# Patient Record
Sex: FEMALE | Race: WHITE | NOT HISPANIC OR LATINO | Employment: PART TIME | URBAN - METROPOLITAN AREA
[De-identification: names, ages, dates, MRNs, and addresses within clinical notes are randomized per-mention and may not be internally consistent; named-entity substitution may affect disease eponyms.]

---

## 2023-10-27 PROBLEM — M05.79 SEROPOSITIVE RHEUMATOID ARTHRITIS OF MULTIPLE SITES (MULTI): Status: ACTIVE | Noted: 2023-10-27

## 2023-11-06 DIAGNOSIS — M05.79 SEROPOSITIVE RHEUMATOID ARTHRITIS OF MULTIPLE SITES (MULTI): ICD-10-CM

## 2023-11-06 PROBLEM — D84.1: Status: ACTIVE | Noted: 2023-11-06

## 2023-11-06 PROBLEM — G56.02 CARPAL TUNNEL SYNDROME OF LEFT WRIST: Status: ACTIVE | Noted: 2023-11-06

## 2023-11-06 PROBLEM — D75.89 MACROCYTOSIS WITHOUT ANEMIA: Status: ACTIVE | Noted: 2023-11-06

## 2023-11-06 PROBLEM — G93.32 CHRONIC FATIGUE SYNDROME: Status: ACTIVE | Noted: 2023-11-06

## 2023-11-06 PROBLEM — E78.5 HYPERLIPIDEMIA: Status: ACTIVE | Noted: 2023-11-06

## 2023-11-06 PROBLEM — D84.1 DEFECTS IN THE COMPLEMENT SYSTEM (MULTI): Status: ACTIVE | Noted: 2023-11-06

## 2023-11-06 PROBLEM — M79.7 FIBROMYALGIA: Status: ACTIVE | Noted: 2023-11-06

## 2023-11-06 PROBLEM — M65.30 TRIGGER FINGER: Status: ACTIVE | Noted: 2023-11-06

## 2023-11-06 RX ORDER — ESCITALOPRAM OXALATE 20 MG/1
20 TABLET ORAL DAILY
COMMUNITY
Start: 2023-05-23 | End: 2023-11-07

## 2023-11-06 RX ORDER — LEVONORGESTREL 52 MG/1
INTRAUTERINE DEVICE INTRAUTERINE
COMMUNITY
Start: 2021-09-17

## 2023-11-06 RX ORDER — DIPHENHYDRAMINE HYDROCHLORIDE 50 MG/ML
50 INJECTION INTRAMUSCULAR; INTRAVENOUS AS NEEDED
Status: CANCELLED | OUTPATIENT
Start: 2023-11-06

## 2023-11-06 RX ORDER — ALBUTEROL SULFATE 0.83 MG/ML
3 SOLUTION RESPIRATORY (INHALATION) AS NEEDED
Status: CANCELLED | OUTPATIENT
Start: 2023-11-06

## 2023-11-06 RX ORDER — FLUOCINONIDE 0.5 MG/G
CREAM TOPICAL
COMMUNITY
Start: 2023-10-16

## 2023-11-06 RX ORDER — TOCILIZUMAB 20 MG/ML
INJECTION, SOLUTION, CONCENTRATE INTRAVENOUS
COMMUNITY
Start: 2021-09-17

## 2023-11-06 RX ORDER — EPINEPHRINE 0.3 MG/.3ML
0.3 INJECTION SUBCUTANEOUS EVERY 5 MIN PRN
Status: CANCELLED | OUTPATIENT
Start: 2023-11-06

## 2023-11-06 RX ORDER — IBUPROFEN 200 MG
200 TABLET ORAL EVERY 8 HOURS PRN
COMMUNITY

## 2023-11-06 RX ORDER — CEPHALEXIN 500 MG/1
500 CAPSULE ORAL 2 TIMES DAILY
COMMUNITY
Start: 2021-08-28 | End: 2023-11-07 | Stop reason: ALTCHOICE

## 2023-11-06 RX ORDER — CITALOPRAM 40 MG/1
40 TABLET, FILM COATED ORAL DAILY
COMMUNITY
End: 2023-11-07

## 2023-11-06 RX ORDER — FAMOTIDINE 10 MG/ML
20 INJECTION INTRAVENOUS ONCE AS NEEDED
Status: CANCELLED | OUTPATIENT
Start: 2023-11-06

## 2023-11-06 RX ORDER — ACETAMINOPHEN 500 MG/1
500 CAPSULE, LIQUID FILLED ORAL EVERY 6 HOURS PRN
COMMUNITY

## 2023-11-07 ENCOUNTER — OFFICE VISIT (OUTPATIENT)
Dept: RHEUMATOLOGY | Facility: CLINIC | Age: 38
End: 2023-11-07
Payer: COMMERCIAL

## 2023-11-07 VITALS
DIASTOLIC BLOOD PRESSURE: 62 MMHG | OXYGEN SATURATION: 99 % | HEIGHT: 57 IN | WEIGHT: 115 LBS | BODY MASS INDEX: 24.81 KG/M2 | SYSTOLIC BLOOD PRESSURE: 100 MMHG | HEART RATE: 79 BPM

## 2023-11-07 DIAGNOSIS — M05.79 RHEUMATOID ARTHRITIS INVOLVING MULTIPLE SITES WITH POSITIVE RHEUMATOID FACTOR (MULTI): Primary | ICD-10-CM

## 2023-11-07 PROCEDURE — 1036F TOBACCO NON-USER: CPT | Performed by: INTERNAL MEDICINE

## 2023-11-07 PROCEDURE — 3008F BODY MASS INDEX DOCD: CPT | Performed by: INTERNAL MEDICINE

## 2023-11-07 PROCEDURE — 99213 OFFICE O/P EST LOW 20 MIN: CPT | Mod: 25 | Performed by: INTERNAL MEDICINE

## 2023-11-07 PROCEDURE — G0463 HOSPITAL OUTPT CLINIC VISIT: HCPCS | Mod: 25 | Performed by: INTERNAL MEDICINE

## 2023-11-07 PROCEDURE — 96413 CHEMO IV INFUSION 1 HR: CPT | Performed by: INTERNAL MEDICINE

## 2023-11-07 PROCEDURE — 99213 OFFICE O/P EST LOW 20 MIN: CPT | Performed by: INTERNAL MEDICINE

## 2023-11-07 ASSESSMENT — ROUTINE ASSESSMENT OF PATIENT INDEX DATA (RAPID3)
LIFT_CUP_TO_MOUTH: WITHOUT ANY DIFFICULTY
FEELINGS_ANXIETY_NERVOUS: WITH SOME DIFFICULTY
ON A SCALE OF ONE TO TEN, CONSIDERING ALL THE WAYS IN WHICH ILLNESS AND HEALTH CONDITIONS MAY AFFECT YOU AT THIS TIME, PLEASE INDICATE BELOW HOW YOU ARE DOING:: 0.5
ON A SCALE OF ONE TO TEN, HOW MUCH PAIN HAVE YOU HAD BECAUSE OF YOUR CONDITION OVER THE PAST WEEK?: 1
SUM OF QUESTIONS A TO J: 1
WASH_DRY_BODY: WITHOUT ANY DIFFICULTY
PARTIPATE_RECREATIONAL_ACTIVITIES: WITH SOME DIFFICULTY
ON A SCALE OF ONE TO TEN, CONSIDERING ALL THE WAYS IN WHICH ILLNESS AND HEALTH CONDITIONS MAY AFFECT YOU AT THIS TIME, PLEASE INDICATE BELOW HOW YOU ARE DOING:: 0.5
IN_OUT_TRANSPORT: WITHOUT ANY DIFFICULTY
SEVERITY_SCORE: 0
WALK_FLAT_GROUND: WITHOUT ANY DIFFICULTY
DRESS_YOURSELF: WITHOUT ANY DIFFICULTY
FN_SCORE: 0.3
GOOD_NIGHTS_SLEEP: WITHOUT ANY DIFFICULTY
WEIGHTED_TOTAL_SCORE: 0.6
PICK_CLOTHES_OFF_FLOOR: WITHOUT ANY DIFFICULTY
WALK_KILOMETERS: WITHOUT ANY DIFFICULTY
TOTAL RAPID3 SCORE: 1.8
FEELINGS_DEPRESSION: WITHOUT ANY DIFFICULTY
TURN_FAUCETS_OFF: WITHOUT ANY DIFFICULTY
ON A SCALE OF ONE TO TEN, HOW MUCH PAIN HAVE YOU HAD BECAUSE OF YOUR CONDITION OVER THE PAST WEEK?: 1
IN_OUT_BED: WITHOUT ANY DIFFICULTY

## 2023-11-07 ASSESSMENT — PAIN SCALES - GENERAL: PAINLEVEL: 0-NO PAIN

## 2023-11-07 ASSESSMENT — PATIENT HEALTH QUESTIONNAIRE - PHQ9
2. FEELING DOWN, DEPRESSED OR HOPELESS: NOT AT ALL
1. LITTLE INTEREST OR PLEASURE IN DOING THINGS: NOT AT ALL
SUM OF ALL RESPONSES TO PHQ9 QUESTIONS 1 AND 2: 0

## 2023-11-07 ASSESSMENT — JOINT PAIN
TOTAL NUMBER OF SWOLLEN JOINTS: 0
TOTAL NUMBER OF TENDER JOINTS: 0

## 2023-11-08 RX ORDER — EPINEPHRINE 0.3 MG/.3ML
0.3 INJECTION SUBCUTANEOUS EVERY 5 MIN PRN
Status: CANCELLED | OUTPATIENT
Start: 2023-12-06

## 2023-11-08 RX ORDER — FAMOTIDINE 10 MG/ML
20 INJECTION INTRAVENOUS ONCE AS NEEDED
Status: CANCELLED | OUTPATIENT
Start: 2023-12-06

## 2023-11-08 RX ORDER — ALBUTEROL SULFATE 0.83 MG/ML
3 SOLUTION RESPIRATORY (INHALATION) AS NEEDED
Status: CANCELLED | OUTPATIENT
Start: 2023-12-06

## 2023-11-08 RX ORDER — DIPHENHYDRAMINE HYDROCHLORIDE 50 MG/ML
50 INJECTION INTRAMUSCULAR; INTRAVENOUS AS NEEDED
Status: CANCELLED | OUTPATIENT
Start: 2023-12-06

## 2023-12-04 ENCOUNTER — LAB (OUTPATIENT)
Dept: LAB | Facility: LAB | Age: 38
End: 2023-12-04
Payer: COMMERCIAL

## 2023-12-04 DIAGNOSIS — M05.79 RHEUMATOID ARTHRITIS INVOLVING MULTIPLE SITES WITH POSITIVE RHEUMATOID FACTOR (MULTI): ICD-10-CM

## 2023-12-04 DIAGNOSIS — M05.79 SEROPOSITIVE RHEUMATOID ARTHRITIS OF MULTIPLE SITES (MULTI): ICD-10-CM

## 2023-12-04 LAB
ALBUMIN SERPL-MCNC: 4.9 G/DL (ref 3.5–5)
ALP BLD-CCNC: 35 U/L (ref 35–125)
ALT SERPL-CCNC: 19 U/L (ref 5–40)
ANION GAP SERPL CALC-SCNC: 13 MMOL/L
AST SERPL-CCNC: 22 U/L (ref 5–40)
BASOPHILS # BLD AUTO: 0.04 X10*3/UL (ref 0–0.1)
BASOPHILS # BLD AUTO: 0.04 X10*3/UL (ref 0–0.1)
BASOPHILS NFR BLD AUTO: 1 %
BASOPHILS NFR BLD AUTO: 1 %
BILIRUB SERPL-MCNC: 0.4 MG/DL (ref 0.1–1.2)
BUN SERPL-MCNC: 12 MG/DL (ref 8–25)
CALCIUM SERPL-MCNC: 9.4 MG/DL (ref 8.5–10.4)
CCP IGG SERPL-ACNC: 30 U/ML
CHLORIDE SERPL-SCNC: 103 MMOL/L (ref 97–107)
CHOLEST SERPL-MCNC: 215 MG/DL (ref 133–200)
CHOLEST/HDLC SERPL: 2.4 {RATIO}
CO2 SERPL-SCNC: 23 MMOL/L (ref 24–31)
CREAT SERPL-MCNC: 0.6 MG/DL (ref 0.4–1.6)
CRP SERPL-MCNC: <0.3 MG/DL (ref 0–2)
EOSINOPHIL # BLD AUTO: 0.07 X10*3/UL (ref 0–0.7)
EOSINOPHIL # BLD AUTO: 0.08 X10*3/UL (ref 0–0.7)
EOSINOPHIL NFR BLD AUTO: 1.8 %
EOSINOPHIL NFR BLD AUTO: 2 %
ERYTHROCYTE [DISTWIDTH] IN BLOOD BY AUTOMATED COUNT: 11.9 % (ref 11.5–14.5)
ERYTHROCYTE [SEDIMENTATION RATE] IN BLOOD BY WESTERGREN METHOD: 8 MM/H (ref 0–20)
GFR SERPL CREATININE-BSD FRML MDRD: >90 ML/MIN/1.73M*2
GLUCOSE SERPL-MCNC: 84 MG/DL (ref 65–99)
HAV IGM SER QL: NONREACTIVE
HBV CORE IGM SER QL: NONREACTIVE
HBV SURFACE AG SERPL QL IA: NONREACTIVE
HCT VFR BLD AUTO: 41.8 % (ref 36–46)
HCT VFR BLD AUTO: 42.1 % (ref 36–46)
HCT VFR BLD AUTO: 42.1 % (ref 36–46)
HCV AB SER QL: NONREACTIVE
HDLC SERPL-MCNC: 91 MG/DL
HGB BLD-MCNC: 13.8 G/DL (ref 12–16)
HGB BLD-MCNC: 14.1 G/DL (ref 12–16)
HGB BLD-MCNC: 14.1 G/DL (ref 12–16)
IMM GRANULOCYTES # BLD AUTO: 0.01 X10*3/UL (ref 0–0.7)
IMM GRANULOCYTES # BLD AUTO: 0.01 X10*3/UL (ref 0–0.7)
IMM GRANULOCYTES NFR BLD AUTO: 0.3 % (ref 0–0.9)
IMM GRANULOCYTES NFR BLD AUTO: 0.3 % (ref 0–0.9)
LDLC SERPL CALC-MCNC: 108 MG/DL (ref 65–130)
LYMPHOCYTES # BLD AUTO: 1.73 X10*3/UL (ref 1.2–4.8)
LYMPHOCYTES # BLD AUTO: 1.74 X10*3/UL (ref 1.2–4.8)
LYMPHOCYTES NFR BLD AUTO: 43.7 %
LYMPHOCYTES NFR BLD AUTO: 44.6 %
MCH RBC QN AUTO: 33.7 PG (ref 26–34)
MCH RBC QN AUTO: 34.1 PG (ref 26–34)
MCH RBC QN AUTO: 34.1 PG (ref 26–34)
MCHC RBC AUTO-ENTMCNC: 33 G/DL (ref 32–36)
MCHC RBC AUTO-ENTMCNC: 33.5 G/DL (ref 32–36)
MCHC RBC AUTO-ENTMCNC: 33.5 G/DL (ref 32–36)
MCV RBC AUTO: 102 FL (ref 80–100)
MONOCYTES # BLD AUTO: 0.39 X10*3/UL (ref 0.1–1)
MONOCYTES # BLD AUTO: 0.39 X10*3/UL (ref 0.1–1)
MONOCYTES NFR BLD AUTO: 10.1 %
MONOCYTES NFR BLD AUTO: 9.8 %
NEUTROPHILS # BLD AUTO: 1.64 X10*3/UL (ref 1.2–7.7)
NEUTROPHILS # BLD AUTO: 1.72 X10*3/UL (ref 1.2–7.7)
NEUTROPHILS NFR BLD AUTO: 42.2 %
NEUTROPHILS NFR BLD AUTO: 43.2 %
NRBC BLD-RTO: 0 /100 WBCS (ref 0–0)
PLATELET # BLD AUTO: 326 X10*3/UL (ref 150–450)
PLATELET # BLD AUTO: 326 X10*3/UL (ref 150–450)
PLATELET # BLD AUTO: 333 X10*3/UL (ref 150–450)
POTASSIUM SERPL-SCNC: 4.7 MMOL/L (ref 3.4–5.1)
PROT SERPL-MCNC: 7.2 G/DL (ref 5.9–7.9)
RBC # BLD AUTO: 4.09 X10*6/UL (ref 4–5.2)
RBC # BLD AUTO: 4.14 X10*6/UL (ref 4–5.2)
RBC # BLD AUTO: 4.14 X10*6/UL (ref 4–5.2)
RHEUMATOID FACT SER NEPH-ACNC: 71 IU/ML (ref 0–15)
SODIUM SERPL-SCNC: 139 MMOL/L (ref 133–145)
TRIGL SERPL-MCNC: 80 MG/DL (ref 40–150)
WBC # BLD AUTO: 3.9 X10*3/UL (ref 4.4–11.3)
WBC # BLD AUTO: 3.9 X10*3/UL (ref 4.4–11.3)
WBC # BLD AUTO: 4 X10*3/UL (ref 4.4–11.3)

## 2023-12-04 PROCEDURE — 80053 COMPREHEN METABOLIC PANEL: CPT

## 2023-12-04 PROCEDURE — 80061 LIPID PANEL: CPT

## 2023-12-04 PROCEDURE — 80074 ACUTE HEPATITIS PANEL: CPT

## 2023-12-04 PROCEDURE — 85025 COMPLETE CBC W/AUTO DIFF WBC: CPT

## 2023-12-04 PROCEDURE — 86481 TB AG RESPONSE T-CELL SUSP: CPT

## 2023-12-04 PROCEDURE — 86140 C-REACTIVE PROTEIN: CPT

## 2023-12-04 PROCEDURE — 85652 RBC SED RATE AUTOMATED: CPT

## 2023-12-04 PROCEDURE — 86431 RHEUMATOID FACTOR QUANT: CPT

## 2023-12-04 PROCEDURE — 86200 CCP ANTIBODY: CPT

## 2023-12-04 PROCEDURE — 36415 COLL VENOUS BLD VENIPUNCTURE: CPT

## 2023-12-06 LAB
NIL(NEG) CONTROL SPOT COUNT: NORMAL
PANEL A SPOT COUNT: 0
PANEL B SPOT COUNT: 1
POS CONTROL SPOT COUNT: NORMAL
T-SPOT. TB INTERPRETATION: NEGATIVE

## 2023-12-07 ENCOUNTER — APPOINTMENT (OUTPATIENT)
Dept: RHEUMATOLOGY | Facility: CLINIC | Age: 38
End: 2023-12-07
Payer: COMMERCIAL

## 2023-12-07 ENCOUNTER — OFFICE VISIT (OUTPATIENT)
Dept: RHEUMATOLOGY | Facility: CLINIC | Age: 38
End: 2023-12-07
Payer: COMMERCIAL

## 2023-12-07 VITALS
OXYGEN SATURATION: 100 % | HEART RATE: 79 BPM | WEIGHT: 112 LBS | DIASTOLIC BLOOD PRESSURE: 62 MMHG | BODY MASS INDEX: 24.16 KG/M2 | HEIGHT: 57 IN | SYSTOLIC BLOOD PRESSURE: 110 MMHG

## 2023-12-07 DIAGNOSIS — D75.89 MACROCYTOSIS WITHOUT ANEMIA: ICD-10-CM

## 2023-12-07 DIAGNOSIS — Z79.899 ON LONG TERM DRUG THERAPY: ICD-10-CM

## 2023-12-07 DIAGNOSIS — D84.1 DEFECTS IN THE COMPLEMENT SYSTEM (MULTI): ICD-10-CM

## 2023-12-07 DIAGNOSIS — M05.79 RHEUMATOID ARTHRITIS INVOLVING MULTIPLE SITES WITH POSITIVE RHEUMATOID FACTOR (MULTI): Primary | ICD-10-CM

## 2023-12-07 PROCEDURE — 1036F TOBACCO NON-USER: CPT | Performed by: INTERNAL MEDICINE

## 2023-12-07 PROCEDURE — 99214 OFFICE O/P EST MOD 30 MIN: CPT | Performed by: INTERNAL MEDICINE

## 2023-12-07 PROCEDURE — 3008F BODY MASS INDEX DOCD: CPT | Performed by: INTERNAL MEDICINE

## 2023-12-07 ASSESSMENT — ROUTINE ASSESSMENT OF PATIENT INDEX DATA (RAPID3)
FEELINGS_DEPRESSION: WITHOUT ANY DIFFICULTY
PICK_CLOTHES_OFF_FLOOR: WITHOUT ANY DIFFICULTY
SUM OF QUESTIONS A TO J: 1
WALK_KILOMETERS: WITHOUT ANY DIFFICULTY
ON A SCALE OF ONE TO TEN, HOW MUCH PAIN HAVE YOU HAD BECAUSE OF YOUR CONDITION OVER THE PAST WEEK?: 0.5
TURN_FAUCETS_OFF: WITHOUT ANY DIFFICULTY
DRESS_YOURSELF: WITHOUT ANY DIFFICULTY
TOTAL RAPID3 SCORE: 1.8
WEIGHTED_TOTAL_SCORE: 0.6
PARTIPATE_RECREATIONAL_ACTIVITIES: WITH SOME DIFFICULTY
IN_OUT_BED: WITHOUT ANY DIFFICULTY
ON A SCALE OF ONE TO TEN, CONSIDERING ALL THE WAYS IN WHICH ILLNESS AND HEALTH CONDITIONS MAY AFFECT YOU AT THIS TIME, PLEASE INDICATE BELOW HOW YOU ARE DOING:: 1
FN_SCORE: 0.3
LIFT_CUP_TO_MOUTH: WITHOUT ANY DIFFICULTY
FEELINGS_ANXIETY_NERVOUS: WITH SOME DIFFICULTY
SEVERITY_SCORE: 0
ON A SCALE OF ONE TO TEN, CONSIDERING ALL THE WAYS IN WHICH ILLNESS AND HEALTH CONDITIONS MAY AFFECT YOU AT THIS TIME, PLEASE INDICATE BELOW HOW YOU ARE DOING:: 1
WASH_DRY_BODY: WITHOUT ANY DIFFICULTY
IN_OUT_TRANSPORT: WITHOUT ANY DIFFICULTY
WALK_FLAT_GROUND: WITHOUT ANY DIFFICULTY
ON A SCALE OF ONE TO TEN, HOW MUCH PAIN HAVE YOU HAD BECAUSE OF YOUR CONDITION OVER THE PAST WEEK?: 0.5
GOOD_NIGHTS_SLEEP: WITHOUT ANY DIFFICULTY

## 2023-12-07 ASSESSMENT — PATIENT HEALTH QUESTIONNAIRE - PHQ9
2. FEELING DOWN, DEPRESSED OR HOPELESS: NOT AT ALL
SUM OF ALL RESPONSES TO PHQ9 QUESTIONS 1 AND 2: 0
1. LITTLE INTEREST OR PLEASURE IN DOING THINGS: NOT AT ALL

## 2023-12-07 ASSESSMENT — PAIN SCALES - GENERAL: PAINLEVEL: 0-NO PAIN

## 2023-12-07 ASSESSMENT — JOINT PAIN
TOTAL NUMBER OF TENDER JOINTS: 0
TOTAL NUMBER OF SWOLLEN JOINTS: 1

## 2023-12-07 NOTE — PROGRESS NOTES
"Bria DAVID Cristy     Chief Complaint:  This 38 y.o. year old female with seropositive rheumatoid arthritis (RA) presents for treatment with ACTEMRA.     HPI  Subjective:  Prob. #1: RA       The patient returns for her monthly treatment of RA with ACTEMRA.       The patient has been on treatment with this agent for several years, and has seen excellent control of RA disease activity. In fact, her disease activity is nil.        The patient denies any new joint swelling, prolonged AM stiffness, new joint deformities, new subcutaneous nodules, or cutaneous vasculitic ulcers of extremities.         In fact, the patient's joints are \"normal and work good\". She offers no complaints regarding the ACTEMRA infusions or new joint symptoms.         As pointed out previously, the patient is pleased with the outcome of her ACTEMRA treatment program. She has enjoyed being asymptomatic and having no adverse drug effects.     Review of Systems   All other systems reviewed and are negative.    Objective:  Vital signs:  Vitals:    12/07/23 1350   BP: 110/62   Pulse: 79   SpO2: 100%   Weight: 50.8 kg (112 lb)   Height: 1.448 m (4' 9.01\")   PainSc: 0-No pain     Physical Exam  Vitals and nursing note reviewed.   Constitutional:       General: She is not in acute distress.     Appearance: Normal appearance. She is normal weight. She is not ill-appearing.   HENT:      Head: Normocephalic.      Nose: Nose normal.      Mouth/Throat:      Mouth: Mucous membranes are moist.      Pharynx: Oropharynx is clear.   Eyes:      Extraocular Movements: Extraocular movements intact.      Conjunctiva/sclera: Conjunctivae normal.      Pupils: Pupils are equal, round, and reactive to light.   Neck:      Vascular: No carotid bruit.   Cardiovascular:      Rate and Rhythm: Normal rate and regular rhythm.      Pulses: Normal pulses.      Heart sounds: Normal heart sounds. No murmur heard.     No gallop.   Pulmonary:      Effort: Pulmonary effort is normal.    "   Breath sounds: No wheezing, rhonchi or rales.   Abdominal:      General: Abdomen is flat.      Palpations: Abdomen is soft.      Tenderness: There is no abdominal tenderness.   Musculoskeletal:         General: Swelling and deformity present. No tenderness or signs of injury.      Cervical back: Normal range of motion and neck supple. No rigidity or tenderness.   Lymphadenopathy:      Cervical: No cervical adenopathy.   Neurological:      Mental Status: She is alert.        Right knee: mild effusion/swelling w/o pain.     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  Long term drug therapy - Z79.899  Plan:  Patient can be treated today. Actemra 254 mg [5 mg/kg] IV over 1 hour.  The rationale, objectives, risk/reward and potential side effects have been explained to the patient and the patient understands these facts.  The patient further understands that periodic laboratory studies must be performed to monitor this drug and the patient will have these studies performed when the requisition is provided.  The patient is again educated to report to the office adverse effects including allergic reactions or infections.  The patient agreed to this plan.   2.   Return to office in 1 month for ACTEMRA infusion.  3.   Laboratory results reviewed with the patient and educated regarding treatment and potential side effects.  Diego Burgos MD

## 2024-01-09 ENCOUNTER — OFFICE VISIT (OUTPATIENT)
Dept: RHEUMATOLOGY | Facility: CLINIC | Age: 39
End: 2024-01-09
Payer: COMMERCIAL

## 2024-01-09 ENCOUNTER — APPOINTMENT (OUTPATIENT)
Dept: RHEUMATOLOGY | Facility: CLINIC | Age: 39
End: 2024-01-09
Payer: COMMERCIAL

## 2024-01-09 VITALS
SYSTOLIC BLOOD PRESSURE: 100 MMHG | DIASTOLIC BLOOD PRESSURE: 62 MMHG | HEART RATE: 77 BPM | OXYGEN SATURATION: 99 % | HEIGHT: 57 IN | BODY MASS INDEX: 24.81 KG/M2 | WEIGHT: 115 LBS

## 2024-01-09 DIAGNOSIS — M05.79 SEROPOSITIVE RHEUMATOID ARTHRITIS OF MULTIPLE SITES (MULTI): ICD-10-CM

## 2024-01-09 DIAGNOSIS — D84.1 DISORDER OF COMPLEMENT (MULTI): ICD-10-CM

## 2024-01-09 DIAGNOSIS — M05.79 RHEUMATOID ARTHRITIS INVOLVING MULTIPLE SITES WITH POSITIVE RHEUMATOID FACTOR (MULTI): Primary | ICD-10-CM

## 2024-01-09 DIAGNOSIS — E78.2 MIXED HYPERLIPIDEMIA: ICD-10-CM

## 2024-01-09 DIAGNOSIS — D75.89 MACROCYTOSIS WITHOUT ANEMIA: ICD-10-CM

## 2024-01-09 PROCEDURE — 2500000004 HC RX 250 GENERAL PHARMACY W/ HCPCS (ALT 636 FOR OP/ED): Mod: JZ

## 2024-01-09 PROCEDURE — 96413 CHEMO IV INFUSION 1 HR: CPT | Performed by: INTERNAL MEDICINE

## 2024-01-09 PROCEDURE — 1036F TOBACCO NON-USER: CPT | Performed by: INTERNAL MEDICINE

## 2024-01-09 PROCEDURE — 99213 OFFICE O/P EST LOW 20 MIN: CPT | Performed by: INTERNAL MEDICINE

## 2024-01-09 PROCEDURE — 3008F BODY MASS INDEX DOCD: CPT | Performed by: INTERNAL MEDICINE

## 2024-01-09 RX ORDER — ALBUTEROL SULFATE 0.83 MG/ML
3 SOLUTION RESPIRATORY (INHALATION) AS NEEDED
Status: CANCELLED | OUTPATIENT
Start: 2024-01-31

## 2024-01-09 RX ORDER — ATORVASTATIN CALCIUM 10 MG/1
10 TABLET, FILM COATED ORAL DAILY
Qty: 90 TABLET | Refills: 1 | Status: SHIPPED | OUTPATIENT
Start: 2024-01-09 | End: 2024-04-08

## 2024-01-09 RX ORDER — ATORVASTATIN CALCIUM 10 MG/1
10 TABLET, FILM COATED ORAL ONCE
Status: DISCONTINUED | OUTPATIENT
Start: 2024-01-09 | End: 2024-01-09

## 2024-01-09 RX ORDER — EPINEPHRINE 0.3 MG/.3ML
0.3 INJECTION SUBCUTANEOUS EVERY 5 MIN PRN
Status: CANCELLED | OUTPATIENT
Start: 2024-01-31

## 2024-01-09 RX ORDER — FAMOTIDINE 10 MG/ML
20 INJECTION INTRAVENOUS ONCE AS NEEDED
Status: CANCELLED | OUTPATIENT
Start: 2024-01-31

## 2024-01-09 RX ORDER — DIPHENHYDRAMINE HYDROCHLORIDE 50 MG/ML
50 INJECTION INTRAMUSCULAR; INTRAVENOUS AS NEEDED
Status: CANCELLED | OUTPATIENT
Start: 2024-01-31

## 2024-01-09 RX ADMIN — TOCILIZUMAB 300 MG: 20 INJECTION, SOLUTION, CONCENTRATE INTRAVENOUS at 10:22

## 2024-01-09 ASSESSMENT — ROUTINE ASSESSMENT OF PATIENT INDEX DATA (RAPID3)
WASH_DRY_BODY: WITHOUT ANY DIFFICULTY
ON A SCALE OF ONE TO TEN, CONSIDERING ALL THE WAYS IN WHICH ILLNESS AND HEALTH CONDITIONS MAY AFFECT YOU AT THIS TIME, PLEASE INDICATE BELOW HOW YOU ARE DOING:: 0.5
ON A SCALE OF ONE TO TEN, CONSIDERING ALL THE WAYS IN WHICH ILLNESS AND HEALTH CONDITIONS MAY AFFECT YOU AT THIS TIME, PLEASE INDICATE BELOW HOW YOU ARE DOING:: 0.5
TURN_FAUCETS_OFF: WITHOUT ANY DIFFICULTY
PARTIPATE_RECREATIONAL_ACTIVITIES: WITH SOME DIFFICULTY
SUM OF QUESTIONS A TO J: 1
WALK_KILOMETERS: WITHOUT ANY DIFFICULTY
ON A SCALE OF ONE TO TEN, HOW MUCH PAIN HAVE YOU HAD BECAUSE OF YOUR CONDITION OVER THE PAST WEEK?: 0.5
IN_OUT_TRANSPORT: WITHOUT ANY DIFFICULTY
WALK_FLAT_GROUND: WITHOUT ANY DIFFICULTY
TOTAL RAPID3 SCORE: 1.3
FEELINGS_DEPRESSION: WITHOUT ANY DIFFICULTY
FN_SCORE: 0.3
FEELINGS_ANXIETY_NERVOUS: WITH SOME DIFFICULTY
ON A SCALE OF ONE TO TEN, HOW MUCH PAIN HAVE YOU HAD BECAUSE OF YOUR CONDITION OVER THE PAST WEEK?: 0.5
WEIGHTED_TOTAL_SCORE: 0.43
LIFT_CUP_TO_MOUTH: WITHOUT ANY DIFFICULTY
GOOD_NIGHTS_SLEEP: WITHOUT ANY DIFFICULTY
PICK_CLOTHES_OFF_FLOOR: WITHOUT ANY DIFFICULTY
SEVERITY_SCORE: 0
IN_OUT_BED: WITHOUT ANY DIFFICULTY
DRESS_YOURSELF: WITHOUT ANY DIFFICULTY

## 2024-01-09 ASSESSMENT — PATIENT HEALTH QUESTIONNAIRE - PHQ9: 2. FEELING DOWN, DEPRESSED OR HOPELESS: NOT AT ALL

## 2024-01-09 ASSESSMENT — PAIN SCALES - GENERAL: PAINLEVEL: 0-NO PAIN

## 2024-01-09 NOTE — PROGRESS NOTES
"Chief Complaint:  This patient is a 38 y.o. female with seropositive rheumatoid arthritis (RA) who presents for monthly treatment with ACTEMRA.    Subjective:   HPI   Problem:  1: RA       The patient presents for treatment of RA with IV ACTEMRA (TOCILIZUMAB).        The patient denies any adverse drug reactions of any type, and states she has had a very good response to treatment with remission of RA.       The patient reports no interval infections or illnesses requiring treatment despite the occurrence of strep throats in both children and her  had a cough.        Today, the patient feels well and states she can be treated.    Review of Systems   All other systems reviewed and are negative.      Objective:   /62 (BP Location: Left arm, Patient Position: Sitting)   Pulse 77   Ht 1.448 m (4' 9.01\")   Wt 52.2 kg (115 lb)   SpO2 99%   BMI 24.88 kg/m²      Physical Exam  Exam not performed.  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 Actemra 363 mg [5 mg/kg] IV over 1 hour.  The rationale, objectives, risk/reward and potential side effects have been explained to the patient and the patient understands these facts.  The patient further understands that periodic laboratory studies must be performed to monitor this drug and the patient will have these studies performed when the requisition is provided.  The patient is again educated to report to the office adverse effects including allergic reactions or infections.  The patient agreed to this plan.   2.   Mixed hyperlipidemia: I reviewed the results of the patient's last three years of lipid studies. There is definite hyperlipidemia, and, in particular, in the presence of RA should be treated. I recommend treatment with ATORVASTATIN 10 mg/day. I " have discussed with the patient the indications, rationale, risk/benefits, potential adverse effects and probable duration of therapy.  I have answered all questions. Patient states agreement with this recommendation, and will report any side effects to the office should any occur.  3.      Return to office for ACTERMA treatment in one month.       Diego Burgos MD

## 2024-01-09 NOTE — PROGRESS NOTES
Patient tolerated infusion without reaction, vitals stable. Next appointment made and provided to patient.

## 2024-02-06 ENCOUNTER — CLINICAL SUPPORT (OUTPATIENT)
Dept: RHEUMATOLOGY | Facility: CLINIC | Age: 39
End: 2024-02-06
Payer: COMMERCIAL

## 2024-02-06 VITALS
SYSTOLIC BLOOD PRESSURE: 115 MMHG | TEMPERATURE: 98.2 F | HEART RATE: 78 BPM | DIASTOLIC BLOOD PRESSURE: 73 MMHG | OXYGEN SATURATION: 100 %

## 2024-02-06 DIAGNOSIS — M05.79 SEROPOSITIVE RHEUMATOID ARTHRITIS OF MULTIPLE SITES (MULTI): ICD-10-CM

## 2024-02-06 PROCEDURE — 2500000004 HC RX 250 GENERAL PHARMACY W/ HCPCS (ALT 636 FOR OP/ED)

## 2024-02-06 RX ORDER — FAMOTIDINE 10 MG/ML
20 INJECTION INTRAVENOUS ONCE AS NEEDED
Status: CANCELLED | OUTPATIENT
Start: 2024-02-08

## 2024-02-06 RX ORDER — DIPHENHYDRAMINE HYDROCHLORIDE 50 MG/ML
50 INJECTION INTRAMUSCULAR; INTRAVENOUS AS NEEDED
Status: CANCELLED | OUTPATIENT
Start: 2024-02-08

## 2024-02-06 RX ORDER — ALBUTEROL SULFATE 0.83 MG/ML
3 SOLUTION RESPIRATORY (INHALATION) AS NEEDED
Status: CANCELLED | OUTPATIENT
Start: 2024-02-08

## 2024-02-06 RX ORDER — EPINEPHRINE 0.3 MG/.3ML
0.3 INJECTION SUBCUTANEOUS EVERY 5 MIN PRN
Status: CANCELLED | OUTPATIENT
Start: 2024-02-08

## 2024-02-06 RX ADMIN — TOCILIZUMAB 300 MG: 20 INJECTION, SOLUTION, CONCENTRATE INTRAVENOUS at 11:21

## 2024-02-06 NOTE — PROGRESS NOTES
The patient completed her infusion without any signs/symptoms of adverse reaction.  IV flushed with 10ml NS, IV cath removed and site dressed with 2x2 gauze and bandaid.

## 2024-03-10 ENCOUNTER — DOCUMENTATION (OUTPATIENT)
Dept: INFUSION THERAPY | Facility: CLINIC | Age: 39
End: 2024-03-10
Payer: COMMERCIAL

## 2024-03-10 DIAGNOSIS — M06.9 RHEUMATOID ARTHRITIS, INVOLVING UNSPECIFIED SITE, UNSPECIFIED WHETHER RHEUMATOID FACTOR PRESENT (MULTI): Primary | ICD-10-CM

## 2024-03-10 NOTE — PROGRESS NOTES
Patient to be scheduled for Continuation of Actemra infusions.     For Diagnosis: Rheumatoid Arthritis     Dosing is weight based at: 6 mg / kg     Using Dosing weight of: 50 kg    For a Total dose of: 300 mg every 4 weeks.    Labs..  T Spot Drawn/Results:   Lab Results   Component Value Date    TBSIN Negative 12/04/2023    TBGRES Negative  Reference range: NEGATIVE   02/07/2022    TSPOTR  05/17/2023     Negative  Reference range: Normal Value: Negative    A negative test result does not exclude the possibility of exposure  to   or infection with Mycobacterium tuberculosis (M. tuberculosis).    Patients with recent exposure to TB infected individuals exhibiting a   negative T-SPOT.TB result should be considered for retesting within 6   weeks or if other relevant clinical symptoms indicate.  Results from   T-SPOT.TB testing must be used in conjunction with each individual's   epidemiological history, current medical status, and results of other   diagnostic evaluations.  The T-SPOT.TB test is qualitative and  results   are reported as positive, borderline or negative, given that the test   controls perform as expected. In line with the Centers for Disease   Control and Prevention's 2010 recommendation to report quantitative   measurements alongside the qualitative result, the laboratory  provides   spot counts for informational purposes only.  The T-SPOT.TB test  should   not be interpreted as a quantitative test.  Test performed at:  [address] [address]        Hep B Drawn/Results:  Lab Results   Component Value Date    HEPBSAG Nonreactive 12/04/2023        Lipids drawn:   Lab Results   Component Value Date    CHOL 215 (H) 12/04/2023    CHOL 220 (H) 05/17/2023    CHOL 188 01/07/2021     Lab Results   Component Value Date    HDL 91.0 12/04/2023    HDL 98 05/17/2023    HDL 89 01/07/2021     Lab Results   Component Value Date    LDLCALC 108 12/04/2023    LDLCALC 109 05/17/2023    LDLCALC 85  "01/07/2021     Lab Results   Component Value Date    TRIG 80 12/04/2023    TRIG 65 05/17/2023    TRIG 71 01/07/2021     No components found for: \"CHOLHDL\"     CBC w/ diff drawn:   Lab Results   Component Value Date    WBC 3.9 (L) 12/04/2023    WBC 4.0 (L) 12/04/2023    WBC 3.9 (L) 12/04/2023    HGB 14.1 12/04/2023    HGB 13.8 12/04/2023    HGB 14.1 12/04/2023    HCT 42.1 12/04/2023    HCT 41.8 12/04/2023    HCT 42.1 12/04/2023     (H) 12/04/2023     (H) 12/04/2023     (H) 12/04/2023     12/04/2023     12/04/2023     12/04/2023        WBC:   Lab Results   Component Value Date    WBC 3.9 (L) 12/04/2023    WBC 4.0 (L) 12/04/2023    WBC 3.9 (L) 12/04/2023        Plt:   Lab Results   Component Value Date     12/04/2023     12/04/2023     12/04/2023      (Initial: >=100,000. Hold/contact prescribing provider if: <=100,000)    ANC:   Lab Results   Component Value Date    NEUTROABS 1.64 12/04/2023    NEUTROABS 1.72 12/04/2023      (Initial: >2000   Hold/contact prescribing provider if:  <1000)    ALT:   Lab Results   Component Value Date    ALT 19 12/04/2023      (Initial: <1.5X ULN (and/or)  Hold/contact prescribing provider if: >3X ULN)    AST:   Lab Results   Component Value Date    ALT 19 12/04/2023    AST 22 12/04/2023    ALKPHOS 35 12/04/2023    BILITOT 0.4 12/04/2023        Chemistry    Lab Results   Component Value Date/Time     12/04/2023 1117    K 4.7 12/04/2023 1117     12/04/2023 1117    CO2 23 (L) 12/04/2023 1117    BUN 12 12/04/2023 1117    CREATININE 0.60 12/04/2023 1117    Lab Results   Component Value Date/Time    CALCIUM 9.4 12/04/2023 1117    ALKPHOS 35 12/04/2023 1117    AST 22 12/04/2023 1117    ALT 19 12/04/2023 1117    BILITOT 0.4 12/04/2023 1117           (Initial: <1.5X ULN (and/or) Hold/contact prescribing provider if : >3X ULN)    Urine Hcg test ordered piror to first infusion? Yes (If female pt <60 years of age and with " reproductive capability)   (If urine Hcg test ordered please instruct pt upon scheduling to drink 32 ounces of water 1 hour before arrival so bladder is full for needed urine sample)    History of malignancy or GI perforation? No  Patient Active Problem List   Diagnosis    Seropositive rheumatoid arthritis of multiple sites (CMS/HCC)    Carpal tunnel syndrome of left wrist    Chronic fatigue syndrome    Complement 2 deficiency (CMS/HCC)    Defects in the complement system (CMS/HCC)    Fibromyalgia    Hyperlipidemia    Macrocytosis without anemia    Trigger finger      Past Medical History:   Diagnosis Date    Seropositive rheumatoid arthritis of multiple sites (CMS/HCC) 10/27/2023        Last infusion received: 2/6/24 (if continuation)   Due: Scheduled 3/12/24    These results meet criteria to treat.

## 2024-03-12 ENCOUNTER — INFUSION (OUTPATIENT)
Dept: INFUSION THERAPY | Facility: CLINIC | Age: 39
End: 2024-03-12
Payer: COMMERCIAL

## 2024-03-12 ENCOUNTER — APPOINTMENT (OUTPATIENT)
Dept: RHEUMATOLOGY | Facility: CLINIC | Age: 39
End: 2024-03-12
Payer: COMMERCIAL

## 2024-03-12 VITALS
DIASTOLIC BLOOD PRESSURE: 75 MMHG | OXYGEN SATURATION: 100 % | RESPIRATION RATE: 18 BRPM | WEIGHT: 108.9 LBS | TEMPERATURE: 98.2 F | SYSTOLIC BLOOD PRESSURE: 106 MMHG | BODY MASS INDEX: 23.56 KG/M2 | HEART RATE: 97 BPM

## 2024-03-12 DIAGNOSIS — M05.79 SEROPOSITIVE RHEUMATOID ARTHRITIS OF MULTIPLE SITES (MULTI): ICD-10-CM

## 2024-03-12 PROCEDURE — 96365 THER/PROPH/DIAG IV INF INIT: CPT | Performed by: NURSE PRACTITIONER

## 2024-03-12 RX ORDER — EPINEPHRINE 0.3 MG/.3ML
0.3 INJECTION SUBCUTANEOUS EVERY 5 MIN PRN
Status: CANCELLED | OUTPATIENT
Start: 2024-03-27

## 2024-03-12 RX ORDER — FAMOTIDINE 10 MG/ML
20 INJECTION INTRAVENOUS ONCE AS NEEDED
Status: CANCELLED | OUTPATIENT
Start: 2024-03-27

## 2024-03-12 RX ORDER — DIPHENHYDRAMINE HYDROCHLORIDE 50 MG/ML
50 INJECTION INTRAMUSCULAR; INTRAVENOUS AS NEEDED
Status: CANCELLED | OUTPATIENT
Start: 2024-03-27

## 2024-03-12 RX ORDER — ALBUTEROL SULFATE 0.83 MG/ML
3 SOLUTION RESPIRATORY (INHALATION) AS NEEDED
Status: CANCELLED | OUTPATIENT
Start: 2024-03-27

## 2024-03-12 ASSESSMENT — PAIN SCALES - GENERAL: PAINLEVEL: 0-NO PAIN

## 2024-03-12 NOTE — PATIENT INSTRUCTIONS
Today :Bria Muniz had no medications administered during this visit.     For:   1. Seropositive rheumatoid arthritis of multiple sites (CMS/[facility])         Your next appointment is due in:  28 Days        Please read the  Medication Guide that was given to you and reviewed during todays visit.     (Tell all doctors including dentists that you are taking this medication)     Go to the emergency room or call 911 if:  -You have signs of allergic reaction:   -Rash, hives, itching.   -Swollen, blistered, peeling skin.   -Swelling of face, lips, mouth, tongue or throat.   -Tightness of chest, trouble breathing, swallowing or talking     Call your doctor:  - If IV / injection site gets red, warm, swollen, itchy or leaks fluid or pus.     (Leave dressing on your IV site for at least 2 hours and keep area clean and dry  - If you get sick or have symptoms of infection or are not feeling well for any reason.    (Wash your hands often, stay away from people who are sick)  - If you have side effects from your medication that do not go away or are bothersome.     (Refer to the teaching your nurse gave you for side effects to call your doctor about)    - Common side effects may include:  stuffy nose, headache, feeling tired, muscle aches, upset stomach  - Before receiving any vaccines     - Call the [facility] at   If:  - You get sick, are on antibiotics, have had a recent vaccine, have surgery or dental work and your doctor wants your visit rescheduled.  - You need to cancel and reschedule your visit for any reason. Call at least 2 days before your visit if you need to cancel.   - Your insurance changes before your next visit.    (We will need to get approval from your new insurance. This can take up to two weeks.)     The [facility] is opened Monday thru Friday. We are closed on weekends and holidays.   Voice mail will take your call if the center is closed. If you leave a message please  allow 24 hours for a call back during weekdays. If you leave a message on a weekend/holiday, we will call you back the next business day.

## 2024-03-12 NOTE — PROGRESS NOTES
[facility]   Infusion Clinic Note   Date: 2024   Name: Bria Muniz  : 1985   MRN: [id number]         Reason for Visit: OP Infusion (Actemra)         Visit Type: INFUSION       Ordered By: Dr Burgos      Accompanied by:Self      Diagnosis: Seropositive rheumatoid arthritis of multiple sites (CMS/HCC)       Allergies:   Allergies as of 2024 - Reviewed 2024   Allergen Reaction Noted    Penicillins Hives 2023    Tramadol Dizziness 2023         Current Medications has a current medication list which includes the following prescription(s): acetaminophen, atorvastatin, fluocinonide, ibuprofen, mirena, and actemra, and the following Facility-Administered Medications: tocilizumab (Actemra) 300 mg in sodium chloride 0.9% 100 mL IV.       Vitals:   Vitals:    24 1012   BP: 120/71   Pulse: 99   Resp: 18   Temp: 36.9 °C (98.4 °F)   SpO2: 100%   Weight: 49.4 kg (108 lb 14.4 oz)   PainSc: 0-No pain             Infusion Pre-procedure Checklist:   - Allergies reviewed: yes   - Medications reviewed: yes       - Previous reaction to current treatment: no      Assess patient for the concerns below. Document provider notification as appropriate.  - Active or recent infection with/without current antibiotic use: no  - Recent or planned invasive dental work: no  - Recent or planned surgeries: no  - Recently received or plans to receive vaccinations: no  - Has treatment related toxicities: no  - Is pregnant:  no      Pain: 0   - Is the pain different from normal: no   - Is the pain tolerable: n/a   - Is your Doctor aware:  n/a      Labs: N/A         Fall Risk Screening: Sakshi Fall Risk  History of Falling, Immediate or Within 3 Months: No  Secondary Diagnosis: No  Ambulatory Aid: Walks without aid/bedrest/nurse assist  Intravenous Therapy/Heparin Lock: Yes  Gait/Transferring: Normal/bedrest/immobile  Mental Status: Oriented to own ability  Cantor Fall Risk Score: 20        Review Of Systems:  Review of Systems - Oncology      Infusion Readiness:   - Assessment Concerns Related to Infusion: No  - Provider notified: n/a      Document Below Only If Indicated:   New Patient Education:    N/A (returning patient for continuation of therapy. Ongoing education provided as needed.)        Treatment Conditions & Drug Specific Questions:    Tocilizumab  (ACTEMRA)   (Unless otherwise specified on patient specific therapy plan):     TREATMENT CONDITIONS:  Unless otherwise specified on patient specific therapy plan HOLD and notify provider prior to proceeding with treatment if:   o Platelets LESS than 100 x 10E9L  o ANC LESS than 2 x 10E9/L  o ALT GREATER than 1.5x ULN and/or AST GREATER than 1.5x ULN  o Positive T-spot  o Reactive Hepatitis B Surface Antigen    Lab Results   Component Value Date     12/04/2023     12/04/2023     12/04/2023      Lab Results   Component Value Date    NEUTROABS 1.64 12/04/2023    NEUTROABS 1.72 12/04/2023        Chemistry    Lab Results   Component Value Date/Time     12/04/2023 1117    K 4.7 12/04/2023 1117     12/04/2023 1117    CO2 23 (L) 12/04/2023 1117    BUN 12 12/04/2023 1117    CREATININE 0.60 12/04/2023 1117    Lab Results   Component Value Date/Time    CALCIUM 9.4 12/04/2023 1117    ALKPHOS 35 12/04/2023 1117    AST 22 12/04/2023 1117    ALT 19 12/04/2023 1117    BILITOT 0.4 12/04/2023 1117        ,  Lab Results   Component Value Date    ALT 19 12/04/2023    AST 22 12/04/2023    ALKPHOS 35 12/04/2023    BILITOT 0.4 12/04/2023      Lab Results   Component Value Date    TBSIN Negative 12/04/2023    TBGRES Negative  Reference range: NEGATIVE   02/07/2022    TSPOTR  05/17/2023     Negative  Reference range: Normal Value: Negative    A negative test result does not exclude the possibility of exposure  to   or infection with Mycobacterium tuberculosis (M. tuberculosis).    Patients with recent exposure to TB infected  "individuals exhibiting a   negative T-SPOT.TB result should be considered for retesting within 6   weeks or if other relevant clinical symptoms indicate.  Results from   T-SPOT.TB testing must be used in conjunction with each individual's   epidemiological history, current medical status, and results of other   diagnostic evaluations.  The T-SPOT.TB test is qualitative and  results   are reported as positive, borderline or negative, given that the test   controls perform as expected. In line with the Centers for Disease   Control and Prevention's 2010 recommendation to report quantitative   measurements alongside the qualitative result, the laboratory  provides   spot counts for informational purposes only.  The T-SPOT.TB test  should   not be interpreted as a quantitative test.  Test performed at:  [facility] [address] [place] [address]      No results found for: \"NONUHFIRE\", \"NONUHSWGH\", \"NONUHFISH\", \"EXTHEPBSAG\"  Lab Results   Component Value Date    HEPBSAG Nonreactive 12/04/2023      Lab Results   Component Value Date    HEPAIGM Nonreactive 12/04/2023    HEPBCIGM Nonreactive 12/04/2023    HEPCAB Nonreactive 12/04/2023         Labs reviewed and patient meets treatment conditions? Yes    DRUG SPECIFIC QUESTIONS:  Any history of or new diagnosis of GI perforation? No  (Actemra may increase risk of bowel perforation)      REMINDERS:  PREGNANCY CATEGORY X DRUG. OBTAIN NEGTATIVE PREGNANCY TEST PRIOR TO FIRST INFUSION FOR WOMEN OF CHILDBEARING ABILITY   WEIGHT BASED DRUG     Recommended Vitals/Observation:  Vitals: Monitor patient's vital signs prior to infusion start, every 30 minutes during infusion and 30 minutes after infusion.   Observation: Patient may leave 30 minutes post infusion.        Weight Based Drug Calculations:    WEIGHT BASED DRUGS: Tocilizumab (ACTEMRA)   Patient's dosing weight (kg): 50kg     10% weight variance for prescribed treatment: 45 kg to 55 kg     Patient's weight today:   Vitals: "    03/12/24 1012   Weight: 49.4 kg (108 lb 14.4 oz)         weight range for prescribed dose: 6mg/kg    Patient weight today falls outside of 10% variance or  weight range: No     Home Care pharmacist informed of weight variance: NA    Doses that are weight based have an acceptable variance rule within 10% of the prescribed   order and/or within  weight range. If patient weight on day of infusion falls   outside of the 10% variance, or weight range, infusion is administered and   pharmacy contacted regarding future dosing adjustments, per policy.         Initiated By: Tiffany Jarvis RN   Time: 10:45 AM     Bria had 300mg Actemra in 100ml NS administered on 03/12/24.   1200 Pt discharged after 30 minute observation and is free of signs or symptoms of adverse reaction.

## 2024-04-09 DIAGNOSIS — M05.79 SEROPOSITIVE RHEUMATOID ARTHRITIS OF MULTIPLE SITES (MULTI): Primary | ICD-10-CM

## 2024-04-11 ENCOUNTER — APPOINTMENT (OUTPATIENT)
Dept: RHEUMATOLOGY | Facility: CLINIC | Age: 39
End: 2024-04-11
Payer: COMMERCIAL

## 2024-04-15 ENCOUNTER — APPOINTMENT (OUTPATIENT)
Dept: INFUSION THERAPY | Facility: CLINIC | Age: 39
End: 2024-04-15
Payer: COMMERCIAL

## 2024-04-16 ENCOUNTER — OFFICE VISIT (OUTPATIENT)
Dept: RHEUMATOLOGY | Facility: CLINIC | Age: 39
End: 2024-04-16
Payer: COMMERCIAL

## 2024-04-16 VITALS — OXYGEN SATURATION: 98 % | DIASTOLIC BLOOD PRESSURE: 62 MMHG | SYSTOLIC BLOOD PRESSURE: 108 MMHG | HEART RATE: 68 BPM

## 2024-04-16 DIAGNOSIS — M05.79 SEROPOSITIVE RHEUMATOID ARTHRITIS OF MULTIPLE SITES (MULTI): Primary | ICD-10-CM

## 2024-04-16 PROCEDURE — 1036F TOBACCO NON-USER: CPT | Performed by: INTERNAL MEDICINE

## 2024-04-16 PROCEDURE — 3008F BODY MASS INDEX DOCD: CPT | Performed by: INTERNAL MEDICINE

## 2024-04-16 PROCEDURE — 99213 OFFICE O/P EST LOW 20 MIN: CPT | Performed by: INTERNAL MEDICINE

## 2024-04-16 ASSESSMENT — PATIENT HEALTH QUESTIONNAIRE - PHQ9
SUM OF ALL RESPONSES TO PHQ9 QUESTIONS 1 AND 2: 0
1. LITTLE INTEREST OR PLEASURE IN DOING THINGS: NOT AT ALL
2. FEELING DOWN, DEPRESSED OR HOPELESS: NOT AT ALL

## 2024-04-16 ASSESSMENT — ROUTINE ASSESSMENT OF PATIENT INDEX DATA (RAPID3)
FN_SCORE: 0.7
WALK_FLAT_GROUND: WITHOUT ANY DIFFICULTY
FEELINGS_ANXIETY_NERVOUS: WITH SOME DIFFICULTY
PICK_CLOTHES_OFF_FLOOR: WITHOUT ANY DIFFICULTY
IN_OUT_BED: WITHOUT ANY DIFFICULTY
FEELINGS_DEPRESSION: WITHOUT ANY DIFFICULTY
DRESS_YOURSELF: WITHOUT ANY DIFFICULTY
WASH_DRY_BODY: WITHOUT ANY DIFFICULTY
TURN_FAUCETS_OFF: WITHOUT ANY DIFFICULTY
ON A SCALE OF ONE TO TEN, HOW MUCH PAIN HAVE YOU HAD BECAUSE OF YOUR CONDITION OVER THE PAST WEEK?: 0.5
WALK_KILOMETERS: WITH SOME DIFFICULTY
PARTIPATE_RECREATIONAL_ACTIVITIES: WITH SOME DIFFICULTY
TOTAL RAPID3 SCORE: 1.7
ON A SCALE OF ONE TO TEN, CONSIDERING ALL THE WAYS IN WHICH ILLNESS AND HEALTH CONDITIONS MAY AFFECT YOU AT THIS TIME, PLEASE INDICATE BELOW HOW YOU ARE DOING:: 0.5
SUM OF QUESTIONS A TO J: 2
SEVERITY_SCORE: 0
IN_OUT_TRANSPORT: WITHOUT ANY DIFFICULTY
ON A SCALE OF ONE TO TEN, HOW MUCH PAIN HAVE YOU HAD BECAUSE OF YOUR CONDITION OVER THE PAST WEEK?: 0.5
WEIGHTED_TOTAL_SCORE: 0.57
ON A SCALE OF ONE TO TEN, CONSIDERING ALL THE WAYS IN WHICH ILLNESS AND HEALTH CONDITIONS MAY AFFECT YOU AT THIS TIME, PLEASE INDICATE BELOW HOW YOU ARE DOING:: 0.5
LIFT_CUP_TO_MOUTH: WITHOUT ANY DIFFICULTY
GOOD_NIGHTS_SLEEP: WITHOUT ANY DIFFICULTY

## 2024-04-16 ASSESSMENT — JOINT PAIN
TOTAL NUMBER OF TENDER JOINTS: 0
TOTAL NUMBER OF SWOLLEN JOINTS: 0

## 2024-04-16 ASSESSMENT — ENCOUNTER SYMPTOMS
LOSS OF SENSATION IN FEET: 0
OCCASIONAL FEELINGS OF UNSTEADINESS: 0
DEPRESSION: 0

## 2024-04-16 ASSESSMENT — PAIN SCALES - GENERAL: PAINLEVEL_OUTOF10: 0 - NO PAIN

## 2024-04-16 ASSESSMENT — PAIN - FUNCTIONAL ASSESSMENT: PAIN_FUNCTIONAL_ASSESSMENT: 0-10

## 2024-04-16 NOTE — PROGRESS NOTES
Chief Complaint:  This 38 y.o. female presents with the chief complaint of seropositive rheumatoid arthritis (RA). Follow-up     Subjective:   HPI   Problem:  1: RA       The patient returns for ongoing evaluation and management of RA.       The patient has had her infusion with ACTEMRA, and reports that the RA is well controlled. She further reports no adverse drug effects.        The patient is prepared for her infusion tomorrow at [facility].    Review of Systems   All other systems reviewed and are negative.    Objective:   /62   Pulse 68   SpO2 98%      Physical Exam  Vitals and nursing note reviewed.   Constitutional:       Appearance: She is normal weight.   HENT:      Head: Normocephalic.   Eyes:      Extraocular Movements: Extraocular movements intact.      Conjunctiva/sclera: Conjunctivae normal.      Pupils: Pupils are equal, round, and reactive to light.   Neck:      Vascular: No carotid bruit.   Cardiovascular:      Rate and Rhythm: Normal rate and regular rhythm.      Pulses: Normal pulses.      Heart sounds: Normal heart sounds. No murmur heard.  Pulmonary:      Effort: Pulmonary effort is normal.      Breath sounds: Normal breath sounds.   Musculoskeletal:         General: No swelling, tenderness or deformity. Normal range of motion.      Cervical back: Normal range of motion and neck supple. No rigidity or tenderness.   Lymphadenopathy:      Cervical: No cervical adenopathy.   Neurological:      Mental Status: She is alert.        No active rheumatoid synovitis; slight left hand ulnar deviation     Assessment:   Rheumatoid arthritis - M05.79  2.   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   Macrocytosis w/o anemia, secondary to ACTEMRA - D75.89  4.   MIXED HYPERLIPIDEMIA - E78.2  5.   Long-term current drug therapyp - Z79.899    Plan:    Procedures : Actemra 256 mg [5 mg/kg] IV over 1 hour.  The rationale, objectives, risk/reward and potential side effects have been  explained to the patient and the patient understands these facts.  The patient further understands that periodic laboratory studies must be performed to monitor this drug and the patient will have these studies performed when the requisition is provided.  The patient is again educated to report to the office adverse effects including allergic reactions or infections.  The patient agreed to this plan.   Return to office in 1 month.  Laboratory studies ordered.         Diego Burgos MD

## 2024-04-17 ENCOUNTER — INFUSION (OUTPATIENT)
Dept: INFUSION THERAPY | Facility: CLINIC | Age: 39
End: 2024-04-17
Payer: COMMERCIAL

## 2024-04-17 VITALS
SYSTOLIC BLOOD PRESSURE: 98 MMHG | TEMPERATURE: 99 F | OXYGEN SATURATION: 100 % | WEIGHT: 113 LBS | HEART RATE: 82 BPM | BODY MASS INDEX: 24.45 KG/M2 | DIASTOLIC BLOOD PRESSURE: 69 MMHG | RESPIRATION RATE: 16 BRPM

## 2024-04-17 DIAGNOSIS — M05.79 SEROPOSITIVE RHEUMATOID ARTHRITIS OF MULTIPLE SITES (MULTI): ICD-10-CM

## 2024-04-17 LAB
ALBUMIN SERPL-MCNC: 5 G/DL (ref 3.5–5)
ALP BLD-CCNC: 44 U/L (ref 35–125)
ALT SERPL-CCNC: 21 U/L (ref 5–40)
ANION GAP SERPL CALC-SCNC: 15 MMOL/L
AST SERPL-CCNC: 27 U/L (ref 5–40)
BASOPHILS # BLD AUTO: 0.05 X10*3/UL (ref 0–0.1)
BASOPHILS NFR BLD AUTO: 0.8 %
BILIRUB SERPL-MCNC: 1 MG/DL (ref 0.1–1.2)
BUN SERPL-MCNC: 12 MG/DL (ref 8–25)
C3 SERPL-MCNC: 130 MG/DL (ref 87–200)
C4 SERPL-MCNC: 28 MG/DL (ref 10–50)
CALCIUM SERPL-MCNC: 9.3 MG/DL (ref 8.5–10.4)
CHLORIDE SERPL-SCNC: 99 MMOL/L (ref 97–107)
CO2 SERPL-SCNC: 21 MMOL/L (ref 24–31)
CREAT SERPL-MCNC: 0.7 MG/DL (ref 0.4–1.6)
CRP SERPL-MCNC: <0.3 MG/DL (ref 0–2)
EGFRCR SERPLBLD CKD-EPI 2021: >90 ML/MIN/1.73M*2
EOSINOPHIL # BLD AUTO: 0.09 X10*3/UL (ref 0–0.7)
EOSINOPHIL NFR BLD AUTO: 1.4 %
ERYTHROCYTE [DISTWIDTH] IN BLOOD BY AUTOMATED COUNT: 12 % (ref 11.5–14.5)
ERYTHROCYTE [SEDIMENTATION RATE] IN BLOOD BY WESTERGREN METHOD: 11 MM/H (ref 0–20)
GLUCOSE SERPL-MCNC: 82 MG/DL (ref 65–99)
HCT VFR BLD AUTO: 42.3 % (ref 36–46)
HGB BLD-MCNC: 14 G/DL (ref 12–16)
IMM GRANULOCYTES # BLD AUTO: 0.02 X10*3/UL (ref 0–0.7)
IMM GRANULOCYTES NFR BLD AUTO: 0.3 % (ref 0–0.9)
LYMPHOCYTES # BLD AUTO: 1.79 X10*3/UL (ref 1.2–4.8)
LYMPHOCYTES NFR BLD AUTO: 28.8 %
MCH RBC QN AUTO: 34.2 PG (ref 26–34)
MCHC RBC AUTO-ENTMCNC: 33.1 G/DL (ref 32–36)
MCV RBC AUTO: 103 FL (ref 80–100)
MONOCYTES # BLD AUTO: 0.56 X10*3/UL (ref 0.1–1)
MONOCYTES NFR BLD AUTO: 9 %
NEUTROPHILS # BLD AUTO: 3.71 X10*3/UL (ref 1.2–7.7)
NEUTROPHILS NFR BLD AUTO: 59.7 %
NRBC BLD-RTO: 0 /100 WBCS (ref 0–0)
PLATELET # BLD AUTO: 297 X10*3/UL (ref 150–450)
POTASSIUM SERPL-SCNC: 4.2 MMOL/L (ref 3.4–5.1)
PROT SERPL-MCNC: 7.9 G/DL (ref 5.9–7.9)
RBC # BLD AUTO: 4.09 X10*6/UL (ref 4–5.2)
SODIUM SERPL-SCNC: 135 MMOL/L (ref 133–145)
WBC # BLD AUTO: 6.2 X10*3/UL (ref 4.4–11.3)

## 2024-04-17 PROCEDURE — 80053 COMPREHEN METABOLIC PANEL: CPT

## 2024-04-17 PROCEDURE — 36415 COLL VENOUS BLD VENIPUNCTURE: CPT

## 2024-04-17 PROCEDURE — 86140 C-REACTIVE PROTEIN: CPT

## 2024-04-17 PROCEDURE — 85025 COMPLETE CBC W/AUTO DIFF WBC: CPT

## 2024-04-17 PROCEDURE — 86160 COMPLEMENT ANTIGEN: CPT

## 2024-04-17 PROCEDURE — 85652 RBC SED RATE AUTOMATED: CPT

## 2024-04-17 PROCEDURE — 96365 THER/PROPH/DIAG IV INF INIT: CPT | Performed by: NURSE PRACTITIONER

## 2024-04-17 RX ORDER — ALBUTEROL SULFATE 0.83 MG/ML
3 SOLUTION RESPIRATORY (INHALATION) AS NEEDED
Status: CANCELLED | OUTPATIENT
Start: 2024-05-07

## 2024-04-17 RX ORDER — FAMOTIDINE 10 MG/ML
20 INJECTION INTRAVENOUS ONCE AS NEEDED
Status: CANCELLED | OUTPATIENT
Start: 2024-05-07

## 2024-04-17 RX ORDER — EPINEPHRINE 0.3 MG/.3ML
0.3 INJECTION SUBCUTANEOUS EVERY 5 MIN PRN
Status: CANCELLED | OUTPATIENT
Start: 2024-05-07

## 2024-04-17 RX ORDER — DIPHENHYDRAMINE HYDROCHLORIDE 50 MG/ML
50 INJECTION INTRAMUSCULAR; INTRAVENOUS AS NEEDED
Status: CANCELLED | OUTPATIENT
Start: 2024-05-07

## 2024-04-17 ASSESSMENT — PAIN SCALES - GENERAL: PAINLEVEL: 0-NO PAIN

## 2024-04-17 NOTE — PROGRESS NOTES
[facility]   Infusion Clinic Note   Date: 2024   Name: Bria Muniz  : 1985   MRN: [id number]         Reason for Visit: OP Infusion (Actemra)         Visit Type: INFUSION       Ordered By: Dr Burgos      Accompanied by:Self      Diagnosis: Seropositive rheumatoid arthritis of multiple sites (Multi)       Allergies:   Allergies as of 2024 - Reviewed 2024   Allergen Reaction Noted    Penicillins Hives 2023    Tramadol Dizziness 2023         Current Medications has a current medication list which includes the following prescription(s): acetaminophen, atorvastatin, fluocinonide, ibuprofen, mirena, and actemra, and the following Facility-Administered Medications: tocilizumab.       Vitals:   Vitals:    24 1004 24 1224   BP: 114/76 98/69   Pulse: 80 82   Resp: 16 16   Temp: 36.8 °C (98.2 °F) 37.2 °C (99 °F)   TempSrc: Temporal    SpO2: 96% 100%   Weight: 51.3 kg (113 lb)    PainSc: 0-No pain              Infusion Pre-procedure Checklist:   - Allergies reviewed: yes   - Medications reviewed: yes       - Previous reaction to current treatment: no      Assess patient for the concerns below. Document provider notification as appropriate.  - Active or recent infection with/without current antibiotic use: no  - Recent or planned invasive dental work: no  - Recent or planned surgeries: no  - Recently received or plans to receive vaccinations: no  - Has treatment related toxicities: no  - Is pregnant:  no      Pain: 0   - Is the pain different from normal: no   - Is the pain tolerable: n/a   - Is your Doctor aware:  n/a      Labs: Labs drawn and sent per order         Fall Risk Screening: Sakshi Fall Risk  History of Falling, Immediate or Within 3 Months: No  Secondary Diagnosis: No  Ambulatory Aid: Walks without aid/bedrest/nurse assist  Intravenous Therapy/Heparin Lock: Yes  Gait/Transferring: Normal/bedrest/immobile  Mental Status: Oriented to own  ability  Cantor Fall Risk Score: 20       Review Of Systems:  Review of Systems   All other systems reviewed and are negative.        Infusion Readiness:   - Assessment Concerns Related to Infusion: No  - Provider notified: n/a      Document Below Only If Indicated:   New Patient Education:    N/A (returning patient for continuation of therapy. Ongoing education provided as needed.)        Treatment Conditions & Drug Specific Questions:    Tocilizumab  (ACTEMRA)   (Unless otherwise specified on patient specific therapy plan):     TREATMENT CONDITIONS:  Unless otherwise specified on patient specific therapy plan HOLD and notify provider prior to proceeding with treatment if:   o Platelets LESS than 100 x 10E9L  o ANC LESS than 2 x 10E9/L  o ALT GREATER than 1.5x ULN and/or AST GREATER than 1.5x ULN  o Positive T-spot  o Reactive Hepatitis B Surface Antigen    Lab Results   Component Value Date     12/04/2023     12/04/2023     12/04/2023      Lab Results   Component Value Date    NEUTROABS 1.64 12/04/2023    NEUTROABS 1.72 12/04/2023        Chemistry    Lab Results   Component Value Date/Time     12/04/2023 1117    K 4.7 12/04/2023 1117     12/04/2023 1117    CO2 23 (L) 12/04/2023 1117    BUN 12 12/04/2023 1117    CREATININE 0.60 12/04/2023 1117    Lab Results   Component Value Date/Time    CALCIUM 9.4 12/04/2023 1117    ALKPHOS 35 12/04/2023 1117    AST 22 12/04/2023 1117    ALT 19 12/04/2023 1117    BILITOT 0.4 12/04/2023 1117        ,  Lab Results   Component Value Date    ALT 19 12/04/2023    AST 22 12/04/2023    ALKPHOS 35 12/04/2023    BILITOT 0.4 12/04/2023      Lab Results   Component Value Date    TBSIN Negative 12/04/2023    TBGRES Negative  Reference range: NEGATIVE   02/07/2022    TSPOTR  05/17/2023     Negative  Reference range: Normal Value: Negative    A negative test result does not exclude the possibility of exposure  to   or infection with Mycobacterium tuberculosis  "(M. tuberculosis).    Patients with recent exposure to TB infected individuals exhibiting a   negative T-SPOT.TB result should be considered for retesting within 6   weeks or if other relevant clinical symptoms indicate.  Results from   T-SPOT.TB testing must be used in conjunction with each individual's   epidemiological history, current medical status, and results of other   diagnostic evaluations.  The T-SPOT.TB test is qualitative and  results   are reported as positive, borderline or negative, given that the test   controls perform as expected. In line with the Centers for Disease   Control and Prevention's 2010 recommendation to report quantitative   measurements alongside the qualitative result, the laboratory  provides   spot counts for informational purposes only.  The T-SPOT.TB test  should   not be interpreted as a quantitative test.  Test performed at:  [place] [id number]      No results found for: \"NONUHFIRE\", \"NONUHSWGH\", \"NONUHFISH\", \"EXTHEPBSAG\"  Lab Results   Component Value Date    HEPBSAG Nonreactive 12/04/2023      Lab Results   Component Value Date    HEPAIGM Nonreactive 12/04/2023    HEPBCIGM Nonreactive 12/04/2023    HEPCAB Nonreactive 12/04/2023         Labs reviewed and patient meets treatment conditions? Yes    DRUG SPECIFIC QUESTIONS:  Any history of or new diagnosis of GI perforation? No  (Actemra may increase risk of bowel perforation)      REMINDERS:  PREGNANCY CATEGORY X DRUG. OBTAIN NEGTATIVE PREGNANCY TEST PRIOR TO FIRST INFUSION FOR WOMEN OF CHILDBEARING ABILITY   WEIGHT BASED DRUG     Recommended Vitals/Observation:  Vitals: Monitor patient's vital signs prior to infusion start, every 30 minutes during infusion and 30 minutes after infusion.   Observation: Patient may leave 30 minutes post infusion.        Weight Based Drug Calculations:    WEIGHT BASED DRUGS: Tocilizumab (ACTEMRA)   Patient's dosing weight (kg): 50     10% weight variance for prescribed " treatment: 45 kg to 55 kg     Patient's weight today:   Vitals:    04/17/24 1004   Weight: 51.3 kg (113 lb)         weight range for prescribed dose: 6mg/kg     Patient weight today falls outside of 10% variance or  weight range: No     Home Care pharmacist informed of weight variance: Not applicable    Doses that are weight based have an acceptable variance rule within 10% of the prescribed   order and/or within  weight range. If patient weight on day of infusion falls   outside of the 10% variance, or weight range, infusion is administered and   pharmacy contacted regarding future dosing adjustments, per policy.         Initiated By: Tiffany Jarvis RN   Time: 12:52 PM     We administered tocilizumab (Actemra) 300 mg in sodium chloride 0.9% 100 mL IV.     Order was changed by provider the afternoon 4pm 04/16/24 prior to infusion day 04/17/24 10am from 300mg Actemra to 256mg of Actemra. Med was already pulled, verified, and labeled with instructions for 300mg. Patient was infused with 300mg after receiving an ok/order to infuse 300mg from Dr Burgos.

## 2024-04-17 NOTE — PATIENT INSTRUCTIONS
Today :We administered tocilizumab (Actemra) 300 mg in sodium chloride 0.9% 100 mL IV.     For:   1. Seropositive rheumatoid arthritis of multiple sites (Multi)         Your next appointment is due in:  28 days        Please read the  Medication Guide that was given to you and reviewed during todays visit.     (Tell all doctors including dentists that you are taking this medication)     Go to the emergency room or call 911 if:  -You have signs of allergic reaction:   -Rash, hives, itching.   -Swollen, blistered, peeling skin.   -Swelling of face, lips, mouth, tongue or throat.   -Tightness of chest, trouble breathing, swallowing or talking     Call your doctor:  - If IV / injection site gets red, warm, swollen, itchy or leaks fluid or pus.     (Leave dressing on your IV site for at least 2 hours and keep area clean and dry  - If you get sick or have symptoms of infection or are not feeling well for any reason.    (Wash your hands often, stay away from people who are sick)  - If you have side effects from your medication that do not go away or are bothersome.     (Refer to the teaching your nurse gave you for side effects to call your doctor about)    - Common side effects may include:  stuffy nose, headache, feeling tired, muscle aches, upset stomach  - Before receiving any vaccines     - Call [facility] at   If:  - You get sick, are on antibiotics, have had a recent vaccine, have surgery or dental work and your doctor wants your visit rescheduled.  - You need to cancel and reschedule your visit for any reason. Call at least 2 days before your visit if you need to cancel.   - Your insurance changes before your next visit.    (We will need to get approval from your new insurance. This can take up to two weeks.)     The [facility] is opened Monday thru Friday. We are closed on weekends and holidays.   Voice mail will take your call if the center is closed. If you leave a message  please allow 24 hours for a call back during weekdays. If you leave a message on a weekend/holiday, we will call you back the next business day.

## 2024-04-25 LAB — C2 SERPL-MCNC: 1.3 MG/DL (ref 1.6–4)

## 2024-04-26 LAB — C1Q SERPL-MCNC: 13.6 MG/DL (ref 10.3–20.5)

## 2024-05-14 ENCOUNTER — TRANSCRIBE ORDERS (OUTPATIENT)
Dept: INFUSION THERAPY | Facility: CLINIC | Age: 39
End: 2024-05-14
Payer: COMMERCIAL

## 2024-05-14 DIAGNOSIS — M05.79 SEROPOSITIVE RHEUMATOID ARTHRITIS OF MULTIPLE SITES (MULTI): Primary | ICD-10-CM

## 2024-05-21 ENCOUNTER — INFUSION (OUTPATIENT)
Dept: INFUSION THERAPY | Facility: CLINIC | Age: 39
End: 2024-05-21
Payer: COMMERCIAL

## 2024-05-21 ENCOUNTER — OFFICE VISIT (OUTPATIENT)
Dept: RHEUMATOLOGY | Facility: CLINIC | Age: 39
End: 2024-05-21
Payer: COMMERCIAL

## 2024-05-21 VITALS
WEIGHT: 111 LBS | OXYGEN SATURATION: 98 % | HEART RATE: 64 BPM | BODY MASS INDEX: 22.38 KG/M2 | HEIGHT: 59 IN | SYSTOLIC BLOOD PRESSURE: 110 MMHG | DIASTOLIC BLOOD PRESSURE: 64 MMHG

## 2024-05-21 VITALS
TEMPERATURE: 98.1 F | WEIGHT: 112.1 LBS | OXYGEN SATURATION: 100 % | HEART RATE: 92 BPM | DIASTOLIC BLOOD PRESSURE: 70 MMHG | RESPIRATION RATE: 16 BRPM | SYSTOLIC BLOOD PRESSURE: 101 MMHG | BODY MASS INDEX: 22.64 KG/M2

## 2024-05-21 DIAGNOSIS — M1A.09X0 IDIOPATHIC CHRONIC GOUT OF MULTIPLE SITES WITHOUT TOPHUS: ICD-10-CM

## 2024-05-21 DIAGNOSIS — M05.79 SEROPOSITIVE RHEUMATOID ARTHRITIS OF MULTIPLE SITES (MULTI): ICD-10-CM

## 2024-05-21 DIAGNOSIS — Z79.899 ON LONG TERM DRUG THERAPY: ICD-10-CM

## 2024-05-21 DIAGNOSIS — D75.89 MACROCYTOSIS WITHOUT ANEMIA: Primary | ICD-10-CM

## 2024-05-21 DIAGNOSIS — M05.79 RHEUMATOID ARTHRITIS INVOLVING MULTIPLE SITES WITH POSITIVE RHEUMATOID FACTOR (MULTI): ICD-10-CM

## 2024-05-21 DIAGNOSIS — E78.2 MIXED DYSLIPIDEMIA: ICD-10-CM

## 2024-05-21 DIAGNOSIS — D84.1 DISORDER OF COMPLEMENT (MULTI): ICD-10-CM

## 2024-05-21 PROCEDURE — 99213 OFFICE O/P EST LOW 20 MIN: CPT | Performed by: INTERNAL MEDICINE

## 2024-05-21 PROCEDURE — 3008F BODY MASS INDEX DOCD: CPT | Performed by: INTERNAL MEDICINE

## 2024-05-21 PROCEDURE — 96365 THER/PROPH/DIAG IV INF INIT: CPT | Performed by: NURSE PRACTITIONER

## 2024-05-21 PROCEDURE — 1036F TOBACCO NON-USER: CPT | Performed by: INTERNAL MEDICINE

## 2024-05-21 RX ORDER — DIPHENHYDRAMINE HYDROCHLORIDE 50 MG/ML
50 INJECTION INTRAMUSCULAR; INTRAVENOUS AS NEEDED
OUTPATIENT
Start: 2024-06-04

## 2024-05-21 RX ORDER — ALBUTEROL SULFATE 0.83 MG/ML
3 SOLUTION RESPIRATORY (INHALATION) AS NEEDED
OUTPATIENT
Start: 2024-06-04

## 2024-05-21 RX ORDER — FAMOTIDINE 10 MG/ML
20 INJECTION INTRAVENOUS ONCE AS NEEDED
OUTPATIENT
Start: 2024-06-04

## 2024-05-21 RX ORDER — EPINEPHRINE 0.3 MG/.3ML
0.3 INJECTION SUBCUTANEOUS EVERY 5 MIN PRN
OUTPATIENT
Start: 2024-06-04

## 2024-05-21 ASSESSMENT — ROUTINE ASSESSMENT OF PATIENT INDEX DATA (RAPID3)
ON A SCALE OF ONE TO TEN, CONSIDERING ALL THE WAYS IN WHICH ILLNESS AND HEALTH CONDITIONS MAY AFFECT YOU AT THIS TIME, PLEASE INDICATE BELOW HOW YOU ARE DOING:: 0.5
FN_SCORE: 0.3
ON A SCALE OF ONE TO TEN, CONSIDERING ALL THE WAYS IN WHICH ILLNESS AND HEALTH CONDITIONS MAY AFFECT YOU AT THIS TIME, PLEASE INDICATE BELOW HOW YOU ARE DOING:: 0.5
ON A SCALE OF ONE TO TEN, HOW MUCH PAIN HAVE YOU HAD BECAUSE OF YOUR CONDITION OVER THE PAST WEEK?: 1
FEELINGS_DEPRESSION: WITHOUT ANY DIFFICULTY
TURN_FAUCETS_OFF: WITHOUT ANY DIFFICULTY
LIFT_CUP_TO_MOUTH: WITHOUT ANY DIFFICULTY
SUM OF QUESTIONS A TO J: 1
DRESS_YOURSELF: WITHOUT ANY DIFFICULTY
IN_OUT_BED: WITHOUT ANY DIFFICULTY
WEIGHTED_TOTAL_SCORE: 0.6
PARTIPATE_RECREATIONAL_ACTIVITIES: WITH SOME DIFFICULTY
PICK_CLOTHES_OFF_FLOOR: WITHOUT ANY DIFFICULTY
TOTAL RAPID3 SCORE: 1.8
IN_OUT_TRANSPORT: WITHOUT ANY DIFFICULTY
WALK_FLAT_GROUND: WITHOUT ANY DIFFICULTY
WASH_DRY_BODY: WITHOUT ANY DIFFICULTY
GOOD_NIGHTS_SLEEP: WITHOUT ANY DIFFICULTY
SEVERITY_SCORE: 0
WALK_KILOMETERS: WITHOUT ANY DIFFICULTY
ON A SCALE OF ONE TO TEN, HOW MUCH PAIN HAVE YOU HAD BECAUSE OF YOUR CONDITION OVER THE PAST WEEK?: 1
FEELINGS_ANXIETY_NERVOUS: WITH SOME DIFFICULTY
SEVERITY_SCORE: NEAR REMISSION (NR)

## 2024-05-21 ASSESSMENT — ENCOUNTER SYMPTOMS
OCCASIONAL FEELINGS OF UNSTEADINESS: 0
LOSS OF SENSATION IN FEET: 0
DEPRESSION: 0

## 2024-05-21 ASSESSMENT — JOINT PAIN
TOTAL NUMBER OF SWOLLEN JOINTS: 0
TOTAL NUMBER OF TENDER JOINTS: 0

## 2024-05-21 ASSESSMENT — PAIN SCALES - GENERAL: PAINLEVEL: 0-NO PAIN

## 2024-05-21 NOTE — PROGRESS NOTES
[facility]   Infusion Clinic Note   Date: May 21, 2024   Name: Bria Muniz  : 1985   MRN: [id number]         Reason for Visit: OP Infusion (Actemra)         Visit Type: INFUSION       Ordered By: Dr Burgos      Accompanied by:Self      Diagnosis: Seropositive rheumatoid arthritis of multiple sites (Multi)       Allergies:   Allergies as of 2024 - Reviewed 2024   Allergen Reaction Noted    Penicillins Hives 2023    Tramadol Dizziness 2023         Current Medications has a current medication list which includes the following prescription(s): acetaminophen, atorvastatin, fluocinonide, ibuprofen, mirena, and actemra.       Vitals:   Vitals:    24 1307   BP: 118/78   Pulse: 98   Resp: 16   SpO2: 100%   Weight: 50.8 kg (112 lb 1.6 oz)             Infusion Pre-procedure Checklist:   - Allergies reviewed: yes   - Medications reviewed: yes       - Previous reaction to current treatment: no      Assess patient for the concerns below. Document provider notification as appropriate.  - Active or recent infection with/without current antibiotic use: no  - Recent or planned invasive dental work: no  - Recent or planned surgeries: no  - Recently received or plans to receive vaccinations: no  - Has treatment related toxicities: no  - Is pregnant:  no      Pain: 0   - Is the pain different from normal: no   - Is the pain tolerable: n/a   - Is your Doctor aware:  n/a      Labs: Labs drawn and sent per order         Fall Risk Screening: Sakshi Fall Risk  History of Falling, Immediate or Within 3 Months: No  Secondary Diagnosis: Yes  Ambulatory Aid: Walks without aid/bedrest/nurse assist  Intravenous Therapy/Heparin Lock: Yes  Gait/Transferring: Normal/bedrest/immobile  Mental Status: Oriented to own ability  Cantor Fall Risk Score: 35       Review Of Systems:  Review of Systems   All other systems reviewed and are negative.        Infusion Readiness:   - Assessment  Concerns Related to Infusion: No  - Provider notified: n/a      Document Below Only If Indicated:   New Patient Education:    N/A (returning patient for continuation of therapy. Ongoing education provided as needed.)        Treatment Conditions & Drug Specific Questions:    Tocilizumab  (ACTEMRA)   (Unless otherwise specified on patient specific therapy plan):     TREATMENT CONDITIONS:  Unless otherwise specified on patient specific therapy plan HOLD and notify provider prior to proceeding with treatment if:   o Platelets LESS than 100 x 10E9L  o ANC LESS than 2 x 10E9/L  o ALT GREATER than 1.5x ULN and/or AST GREATER than 1.5x ULN  o Positive T-spot  o Reactive Hepatitis B Surface Antigen    Lab Results   Component Value Date     04/17/2024      Lab Results   Component Value Date    NEUTROABS 3.71 04/17/2024        Chemistry    Lab Results   Component Value Date/Time     04/17/2024 1014    K 4.2 04/17/2024 1014    CL 99 04/17/2024 1014    CO2 21 (L) 04/17/2024 1014    BUN 12 04/17/2024 1014    CREATININE 0.70 04/17/2024 1014    Lab Results   Component Value Date/Time    CALCIUM 9.3 04/17/2024 1014    ALKPHOS 44 04/17/2024 1014    AST 27 04/17/2024 1014    ALT 21 04/17/2024 1014    BILITOT 1.0 04/17/2024 1014        ,  Lab Results   Component Value Date    ALT 21 04/17/2024    AST 27 04/17/2024    ALKPHOS 44 04/17/2024    BILITOT 1.0 04/17/2024      Lab Results   Component Value Date    TBSIN Negative 12/04/2023    TBGRES Negative  Reference range: NEGATIVE   02/07/2022    TSPOTR  05/17/2023     Negative  Reference range: Normal Value: Negative    A negative test result does not exclude the possibility of exposure  to   or infection with Mycobacterium tuberculosis (M. tuberculosis).    Patients with recent exposure to TB infected individuals exhibiting a   negative T-SPOT.TB result should be considered for retesting within 6   weeks or if other relevant clinical symptoms indicate.  Results from  "  T-SPOT.TB testing must be used in conjunction with each individual's   epidemiological history, current medical status, and results of other   diagnostic evaluations.  The T-SPOT.TB test is qualitative and  results   are reported as positive, borderline or negative, given that the test   controls perform as expected. In line with the Centers for Disease   Control and Prevention's 2010 recommendation to report quantitative   measurements alongside the qualitative result, the laboratory  provides   spot counts for informational purposes only.  The T-SPOT.TB test  should   not be interpreted as a quantitative test.  Test performed at:  [address] [address]      No results found for: \"NONUHFIRE\", \"NONUHSWGH\", \"NONUHFISH\", \"EXTHEPBSAG\"  Lab Results   Component Value Date    HEPBSAG Nonreactive 12/04/2023      Lab Results   Component Value Date    HEPAIGM Nonreactive 12/04/2023    HEPBCIGM Nonreactive 12/04/2023    HEPCAB Nonreactive 12/04/2023         Labs reviewed and patient meets treatment conditions? Yes    DRUG SPECIFIC QUESTIONS:  Any history of or new diagnosis of GI perforation? No  (Actemra may increase risk of bowel perforation)      REMINDERS:  PREGNANCY CATEGORY X DRUG. OBTAIN NEGTATIVE PREGNANCY TEST PRIOR TO FIRST INFUSION FOR WOMEN OF CHILDBEARING ABILITY   WEIGHT BASED DRUG     Recommended Vitals/Observation:  Vitals: Monitor patient's vital signs prior to infusion start, every 30 minutes during infusion and 30 minutes after infusion.   Observation: Patient may leave 30 minutes post infusion.        Weight Based Drug Calculations:    WEIGHT BASED DRUGS: Tocilizumab (ACTEMRA)   Patient's dosing weight (kg): 50     10% weight variance for prescribed treatment: 45 kg to 55 kg     Patient's weight today:   Vitals:    05/21/24 1307   Weight: 50.8 kg (112 lb 1.6 oz)         weight range for prescribed dose: 6mg/kg     Patient weight today falls outside of 10% variance or "  weight range: No     Home Care pharmacist informed of weight variance: Not applicable    Doses that are weight based have an acceptable variance rule within 10% of the prescribed   order and/or within  weight range. If patient weight on day of infusion falls   outside of the 10% variance, or weight range, infusion is administered and   pharmacy contacted regarding future dosing adjustments, per policy.         Initiated By: Teresa Neumann RN   Time: 2:38 PM     We administered tocilizumab (Actemra) 250 mg in sodium chloride 0.9% 100 mL IV.     1423- Infusion complete. 30 ml rinse infusing.  VSS.

## 2024-05-21 NOTE — PROGRESS NOTES
"Chief Complaint:    This 38 y.o. female presents with the chief complaint of seropositive rheumatoid arthritis (RA).     Subjective:   HPI   Problem:  1: RA       The patient returns for ongoing follow-up of seropositive RA treated monthly with TOCILIZUMAB (ACTEMRA) IV.          Since her previous visit on 4/16/2024, the patient has been well; she denies any interval infections or illnesses. She is  fully active and functional, and denies any limitations in her usual daily chores or obligations. 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        The patient remarks that she continues to have intermittent triggering of certain fingers, a problem that she has dealt with for many years and that has not been progressive. Moreover, the patient denies any active RA-associated symptoms, including AM joint stiffness or soreness, difficulty standing from a seated position, difficulty with walking, or difficulty with getting in and out of a car. She does report some left knee pain walking down stairs at times; however, she denies swelling, clicking, or locking of the joint. She sometimes wonders whether she might have early \"osteo\".         The patient again reports that she tolerates her treatment with TOCILIZUMAB without any problem or difficulty. And, she feels good when she has had her treatments on schedule.        In summary, the patient is faring well on her long-term RA treatment.    Review of Systems   All other systems reviewed and are negative.      Objective:   /64   Pulse 64   Ht 1.499 m (4' 11\")   Wt 50.3 kg (111 lb)   SpO2 98%   BMI 22.42 kg/m²      Physical Exam  Vitals and nursing note reviewed.   Constitutional:       General: She is not in acute distress.     Appearance: Normal appearance. She is normal weight. She is not ill-appearing.   HENT:      Head: Normocephalic.      Right Ear: Tympanic membrane normal.      Left Ear: Tympanic " membrane normal.      Nose: Nose normal.      Comments: No nasal dryness.     Mouth/Throat:      Mouth: Mucous membranes are moist.      Pharynx: No oropharyngeal exudate or posterior oropharyngeal erythema.   Eyes:      Extraocular Movements: Extraocular movements intact.      Conjunctiva/sclera: Conjunctivae normal.      Pupils: Pupils are equal, round, and reactive to light.      Comments: Mild ocular dryness to inspection.   Neck:      Vascular: No carotid bruit.   Cardiovascular:      Rate and Rhythm: Normal rate and regular rhythm.      Pulses:           Carotid pulses are 2+ on the right side and 2+ on the left side.       Radial pulses are 2+ on the right side and 2+ on the left side.        Dorsalis pedis pulses are 1+ on the right side and 1+ on the left side.        Posterior tibial pulses are 2+ on the right side and 2+ on the left side.      Heart sounds: Normal heart sounds. No murmur heard.  Pulmonary:      Effort: Pulmonary effort is normal.   Abdominal:      General: Abdomen is flat. Bowel sounds are normal. There is no distension.      Palpations: Abdomen is soft. There is no mass.      Tenderness: There is no abdominal tenderness.   Musculoskeletal:         General: No swelling or tenderness. Normal range of motion.      Cervical back: Normal range of motion and neck supple. No rigidity or tenderness.   Lymphadenopathy:      Cervical: No cervical adenopathy.   Neurological:      Mental Status: She is alert.           No active synovitis; no rheum nodules; no apparent triggering.  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  The rationale, objectives, risk/reward and potential side effects have been explained to the patient and the patient  understands these facts.  The patient further understands that periodic laboratory studies must be performed to monitor this drug and the patient will have these studies performed when the requisition is provided.  The patient is again educated to report to the office adverse effects including allergic reactions or infections.  The patient agreed to this plan.   No refills required.  Continue ATORVASTATIN treatment for mixed hyperlipidemia  Return to office in one month.  All laboratory results reviewed with patient. All queries addressed. In paticular, C2 deficiency was again reviewed and explained to patient.         Diego Burgos MD

## 2024-05-21 NOTE — PATIENT INSTRUCTIONS
Today :We administered tocilizumab (Actemra) 250 mg in sodium chloride 0.9% 100 mL IV.     For:   1. Seropositive rheumatoid arthritis of multiple sites (Multi)         Your next appointment is due in:  28 days       Please read the  Medication Guide that was given to you and reviewed during todays visit.     (Tell all doctors including dentists that you are taking this medication)     Go to the emergency room or call 911 if:  -You have signs of allergic reaction:   -Rash, hives, itching.   -Swollen, blistered, peeling skin.   -Swelling of face, lips, mouth, tongue or throat.   -Tightness of chest, trouble breathing, swallowing or talking     Call your doctor:  - If IV / injection site gets red, warm, swollen, itchy or leaks fluid or pus.     (Leave dressing on your IV site for at least 2 hours and keep area clean and dry  - If you get sick or have symptoms of infection or are not feeling well for any reason.    (Wash your hands often, stay away from people who are sick)  - If you have side effects from your medication that do not go away or are bothersome.     (Refer to the teaching your nurse gave you for side effects to call your doctor about)    - Common side effects may include:  stuffy nose, headache, feeling tired, muscle aches, upset stomach  - Before receiving any vaccines     - Call [facility] at   If:  - You get sick, are on antibiotics, have had a recent vaccine, have surgery or dental work and your doctor wants your visit rescheduled.  - You need to cancel and reschedule your visit for any reason. Call at least 2 days before your visit if you need to cancel.   - Your insurance changes before your next visit.    (We will need to get approval from your new insurance. This can take up to two weeks.)     [facility] is opened Monday thru Friday. We are closed on weekends and holidays.   Voice mail will take your call if the center is closed. If you leave a message  please allow 24 hours for a call back during weekdays. If you leave a message on a weekend/holiday, we will call you back the next business day.

## 2024-06-12 ENCOUNTER — LAB (OUTPATIENT)
Dept: LAB | Facility: LAB | Age: 39
End: 2024-06-12
Payer: COMMERCIAL

## 2024-06-12 DIAGNOSIS — M1A.09X0 IDIOPATHIC CHRONIC GOUT OF MULTIPLE SITES WITHOUT TOPHUS: ICD-10-CM

## 2024-06-12 DIAGNOSIS — M05.79 SEROPOSITIVE RHEUMATOID ARTHRITIS OF MULTIPLE SITES (MULTI): ICD-10-CM

## 2024-06-12 DIAGNOSIS — M05.79 RHEUMATOID ARTHRITIS INVOLVING MULTIPLE SITES WITH POSITIVE RHEUMATOID FACTOR (MULTI): ICD-10-CM

## 2024-06-12 DIAGNOSIS — D75.89 MACROCYTOSIS WITHOUT ANEMIA: ICD-10-CM

## 2024-06-12 LAB
ANION GAP SERPL CALC-SCNC: 14 MMOL/L
BASOPHILS # BLD AUTO: 0.06 X10*3/UL (ref 0–0.1)
BASOPHILS NFR BLD AUTO: 1 %
BUN SERPL-MCNC: 9 MG/DL (ref 8–25)
CALCIUM SERPL-MCNC: 9.7 MG/DL (ref 8.5–10.4)
CHLORIDE SERPL-SCNC: 101 MMOL/L (ref 97–107)
CHOLEST SERPL-MCNC: 150 MG/DL (ref 133–200)
CHOLEST/HDLC SERPL: 1.8 {RATIO}
CO2 SERPL-SCNC: 22 MMOL/L (ref 24–31)
CREAT SERPL-MCNC: 0.7 MG/DL (ref 0.4–1.6)
CRP SERPL-MCNC: <0.3 MG/DL (ref 0–2)
EGFRCR SERPLBLD CKD-EPI 2021: >90 ML/MIN/1.73M*2
EOSINOPHIL # BLD AUTO: 0.09 X10*3/UL (ref 0–0.7)
EOSINOPHIL NFR BLD AUTO: 1.4 %
ERYTHROCYTE [DISTWIDTH] IN BLOOD BY AUTOMATED COUNT: 11.6 % (ref 11.5–14.5)
ERYTHROCYTE [SEDIMENTATION RATE] IN BLOOD BY WESTERGREN METHOD: 2 MM/H (ref 0–20)
GLUCOSE SERPL-MCNC: 80 MG/DL (ref 65–99)
HCT VFR BLD AUTO: 39.2 % (ref 36–46)
HDLC SERPL-MCNC: 84 MG/DL
HGB BLD-MCNC: 13 G/DL (ref 12–16)
IMM GRANULOCYTES # BLD AUTO: 0.01 X10*3/UL (ref 0–0.7)
IMM GRANULOCYTES NFR BLD AUTO: 0.2 % (ref 0–0.9)
LDLC SERPL CALC-MCNC: 49 MG/DL (ref 65–130)
LYMPHOCYTES # BLD AUTO: 1.76 X10*3/UL (ref 1.2–4.8)
LYMPHOCYTES NFR BLD AUTO: 27.9 %
MCH RBC QN AUTO: 33.8 PG (ref 26–34)
MCHC RBC AUTO-ENTMCNC: 33.2 G/DL (ref 32–36)
MCV RBC AUTO: 102 FL (ref 80–100)
MONOCYTES # BLD AUTO: 0.51 X10*3/UL (ref 0.1–1)
MONOCYTES NFR BLD AUTO: 8.1 %
NEUTROPHILS # BLD AUTO: 3.87 X10*3/UL (ref 1.2–7.7)
NEUTROPHILS NFR BLD AUTO: 61.4 %
NRBC BLD-RTO: 0 /100 WBCS (ref 0–0)
PLATELET # BLD AUTO: 290 X10*3/UL (ref 150–450)
POTASSIUM SERPL-SCNC: 4 MMOL/L (ref 3.4–5.1)
RBC # BLD AUTO: 3.85 X10*6/UL (ref 4–5.2)
SODIUM SERPL-SCNC: 137 MMOL/L (ref 133–145)
TRIGL SERPL-MCNC: 83 MG/DL (ref 40–150)
WBC # BLD AUTO: 6.3 X10*3/UL (ref 4.4–11.3)

## 2024-06-12 PROCEDURE — 86140 C-REACTIVE PROTEIN: CPT

## 2024-06-12 PROCEDURE — 36415 COLL VENOUS BLD VENIPUNCTURE: CPT

## 2024-06-12 PROCEDURE — 80048 BASIC METABOLIC PNL TOTAL CA: CPT

## 2024-06-12 PROCEDURE — 80061 LIPID PANEL: CPT

## 2024-06-12 PROCEDURE — 85025 COMPLETE CBC W/AUTO DIFF WBC: CPT

## 2024-06-12 PROCEDURE — 85652 RBC SED RATE AUTOMATED: CPT

## 2024-06-18 ENCOUNTER — APPOINTMENT (OUTPATIENT)
Dept: RHEUMATOLOGY | Facility: CLINIC | Age: 39
End: 2024-06-18
Payer: COMMERCIAL

## 2024-06-18 ENCOUNTER — APPOINTMENT (OUTPATIENT)
Dept: INFUSION THERAPY | Facility: CLINIC | Age: 39
End: 2024-06-18
Payer: COMMERCIAL

## 2024-06-18 VITALS
OXYGEN SATURATION: 97 % | SYSTOLIC BLOOD PRESSURE: 110 MMHG | HEART RATE: 81 BPM | BODY MASS INDEX: 22.18 KG/M2 | HEIGHT: 59 IN | WEIGHT: 110 LBS | DIASTOLIC BLOOD PRESSURE: 70 MMHG

## 2024-06-18 VITALS
HEART RATE: 81 BPM | TEMPERATURE: 98.7 F | SYSTOLIC BLOOD PRESSURE: 102 MMHG | WEIGHT: 110.5 LBS | DIASTOLIC BLOOD PRESSURE: 69 MMHG | RESPIRATION RATE: 17 BRPM | BODY MASS INDEX: 22.32 KG/M2 | OXYGEN SATURATION: 100 %

## 2024-06-18 DIAGNOSIS — Z79.899 ON LONG TERM DRUG THERAPY: ICD-10-CM

## 2024-06-18 DIAGNOSIS — M05.79 SEROPOSITIVE RHEUMATOID ARTHRITIS OF MULTIPLE SITES (MULTI): ICD-10-CM

## 2024-06-18 DIAGNOSIS — D75.89 MACROCYTOSIS WITHOUT ANEMIA: Primary | ICD-10-CM

## 2024-06-18 DIAGNOSIS — E78.2 MIXED DYSLIPIDEMIA: ICD-10-CM

## 2024-06-18 DIAGNOSIS — M05.79 RHEUMATOID ARTHRITIS INVOLVING MULTIPLE SITES WITH POSITIVE RHEUMATOID FACTOR (MULTI): ICD-10-CM

## 2024-06-18 DIAGNOSIS — D84.1 DISORDER OF COMPLEMENT (MULTI): ICD-10-CM

## 2024-06-18 LAB — PREGNANCY TEST URINE, POC: NEGATIVE

## 2024-06-18 PROCEDURE — 99213 OFFICE O/P EST LOW 20 MIN: CPT | Performed by: INTERNAL MEDICINE

## 2024-06-18 PROCEDURE — 1036F TOBACCO NON-USER: CPT | Performed by: INTERNAL MEDICINE

## 2024-06-18 PROCEDURE — 96365 THER/PROPH/DIAG IV INF INIT: CPT | Performed by: NURSE PRACTITIONER

## 2024-06-18 PROCEDURE — 81025 URINE PREGNANCY TEST: CPT | Performed by: NURSE PRACTITIONER

## 2024-06-18 PROCEDURE — 3008F BODY MASS INDEX DOCD: CPT | Performed by: INTERNAL MEDICINE

## 2024-06-18 RX ORDER — ALBUTEROL SULFATE 0.83 MG/ML
3 SOLUTION RESPIRATORY (INHALATION) AS NEEDED
OUTPATIENT
Start: 2024-07-16

## 2024-06-18 RX ORDER — DIPHENHYDRAMINE HYDROCHLORIDE 50 MG/ML
50 INJECTION INTRAMUSCULAR; INTRAVENOUS AS NEEDED
OUTPATIENT
Start: 2024-07-16

## 2024-06-18 RX ORDER — FAMOTIDINE 10 MG/ML
20 INJECTION INTRAVENOUS ONCE AS NEEDED
OUTPATIENT
Start: 2024-07-16

## 2024-06-18 RX ORDER — EPINEPHRINE 0.3 MG/.3ML
0.3 INJECTION SUBCUTANEOUS EVERY 5 MIN PRN
OUTPATIENT
Start: 2024-07-16

## 2024-06-18 ASSESSMENT — ROUTINE ASSESSMENT OF PATIENT INDEX DATA (RAPID3)
ON A SCALE OF ONE TO TEN, HOW MUCH PAIN HAVE YOU HAD BECAUSE OF YOUR CONDITION OVER THE PAST WEEK?: 1
SEVERITY_SCORE: NEAR REMISSION (NR)
TURN_FAUCETS_OFF: WITHOUT ANY DIFFICULTY
WEIGHTED_TOTAL_SCORE: 0.9
WASH_DRY_BODY: WITHOUT ANY DIFFICULTY
ON A SCALE OF ONE TO TEN, CONSIDERING ALL THE WAYS IN WHICH ILLNESS AND HEALTH CONDITIONS MAY AFFECT YOU AT THIS TIME, PLEASE INDICATE BELOW HOW YOU ARE DOING:: 1
ON A SCALE OF ONE TO TEN, CONSIDERING ALL THE WAYS IN WHICH ILLNESS AND HEALTH CONDITIONS MAY AFFECT YOU AT THIS TIME, PLEASE INDICATE BELOW HOW YOU ARE DOING:: 1
IN_OUT_TRANSPORT: WITHOUT ANY DIFFICULTY
SEVERITY_SCORE: 0
GOOD_NIGHTS_SLEEP: WITHOUT ANY DIFFICULTY
FEELINGS_DEPRESSION: WITHOUT ANY DIFFICULTY
DRESS_YOURSELF: WITHOUT ANY DIFFICULTY
WALK_FLAT_GROUND: WITHOUT ANY DIFFICULTY
TOTAL RAPID3 SCORE: 2.7
FN_SCORE: 0.7
FEELINGS_ANXIETY_NERVOUS: WITH SOME DIFFICULTY
PICK_CLOTHES_OFF_FLOOR: WITHOUT ANY DIFFICULTY
SUM OF QUESTIONS A TO J: 2
ON A SCALE OF ONE TO TEN, HOW MUCH PAIN HAVE YOU HAD BECAUSE OF YOUR CONDITION OVER THE PAST WEEK?: 1
PARTIPATE_RECREATIONAL_ACTIVITIES: WITH SOME DIFFICULTY
WALK_KILOMETERS: WITH SOME DIFFICULTY
IN_OUT_BED: WITHOUT ANY DIFFICULTY
LIFT_CUP_TO_MOUTH: WITHOUT ANY DIFFICULTY

## 2024-06-18 ASSESSMENT — ENCOUNTER SYMPTOMS
APPETITE CHANGE: 1
LOSS OF SENSATION IN FEET: 0
ARTHRALGIAS: 1
OCCASIONAL FEELINGS OF UNSTEADINESS: 0
DEPRESSION: 0

## 2024-06-18 ASSESSMENT — JOINT PAIN
TOTAL NUMBER OF SWOLLEN JOINTS: 0
TOTAL NUMBER OF TENDER JOINTS: 0

## 2024-06-18 ASSESSMENT — PAIN SCALES - GENERAL
PAINLEVEL: 0-NO PAIN
PAINLEVEL: 0-NO PAIN

## 2024-06-18 NOTE — PATIENT INSTRUCTIONS
Today :We administered tocilizumab (Actemra) 250 mg in sodium chloride 0.9% 100 mL IV.     For:   1. Seropositive rheumatoid arthritis of multiple sites (Multi)         Your next appointment is due in:  28 days from today        Please read the  Medication Guide that was given to you and reviewed during todays visit.     (Tell all doctors including dentists that you are taking this medication)     Go to the emergency room or call 911 if:  -You have signs of allergic reaction:   -Rash, hives, itching.   -Swollen, blistered, peeling skin.   -Swelling of face, lips, mouth, tongue or throat.   -Tightness of chest, trouble breathing, swallowing or talking     Call your doctor:  - If IV / injection site gets red, warm, swollen, itchy or leaks fluid or pus.     (Leave dressing on your IV site for at least 2 hours and keep area clean and dry  - If you get sick or have symptoms of infection or are not feeling well for any reason.    (Wash your hands often, stay away from people who are sick)  - If you have side effects from your medication that do not go away or are bothersome.     (Refer to the teaching your nurse gave you for side effects to call your doctor about)    - Common side effects may include:  stuffy nose, headache, feeling tired, muscle aches, upset stomach  - Before receiving any vaccines     - Call the [facility] at   If:  - You get sick, are on antibiotics, have had a recent vaccine, have surgery or dental work and your doctor wants your visit rescheduled.  - You need to cancel and reschedule your visit for any reason. Call at least 2 days before your visit if you need to cancel.   - Your insurance changes before your next visit.    (We will need to get approval from your new insurance. This can take up to two weeks.)     The [facility] is opened Monday thru Friday. We are closed on weekends and holidays.   Voice mail will take your call if the center is closed. If you leave  a message please allow 24 hours for a call back during weekdays. If you leave a message on a weekend/holiday, we will call you back the next business day.

## 2024-06-18 NOTE — PROGRESS NOTES
"Chief Complaint:  This 38 y.o. female with seropositive rheumatoid arthritis (RA) presents for pre-ACTEMRA treatment assessment.     Subjective:   HPI   Problem:  1: RA      Since the patient's previous office visit on 5/21/24, the patient states she is entirely asymptomatic at present.      During the month, however, she had one brief episode of right TMJ pain w/o swelling. The pain lasted <1 day,  resolved spontaneously, and today is asymptomatic. Similarly, the patient had a one day episode of left knee pain  w/o swelling or redness that spontaneously resolved. She thinks it was due to working on her knees. Today, the   knee is entirely asymptomatic.       Otherwise, the patient has been well. She denies any interval infections or illnesses.       Her previous infusion also went uneventfully. She often notes some pre-treatment stiffness that resolves immediately following the infusion. She has never experienced a hypersensitivity response.       In summary, the patient happily reports that her RA is well controlled.    Review of Systems   All other systems reviewed and are negative.    Objective:   /70   Pulse 81   Ht 1.499 m (4' 11\")   Wt 49.9 kg (110 lb)   SpO2 97%   BMI 22.22 kg/m²      Physical Exam  Vitals and nursing note reviewed.   Constitutional:       General: She is not in acute distress.     Appearance: Normal appearance. She is normal weight. She is not ill-appearing.   HENT:      Head: Normocephalic.   Eyes:      Extraocular Movements: Extraocular movements intact.      Conjunctiva/sclera: Conjunctivae normal.      Pupils: Pupils are equal, round, and reactive to light.   Neck:      Vascular: No carotid bruit.   Cardiovascular:      Rate and Rhythm: Normal rate and regular rhythm.      Pulses: Normal pulses.      Heart sounds: Normal heart sounds. No murmur heard.  Pulmonary:      Effort: Pulmonary effort is normal. No respiratory distress.      Breath sounds: Normal breath sounds. No " wheezing, rhonchi or rales.   Musculoskeletal:         General: No swelling, tenderness or deformity. Normal range of motion.      Cervical back: Normal range of motion and neck supple. No rigidity or tenderness.   Lymphadenopathy:      Cervical: No cervical adenopathy.   Neurological:      Mental Status: She is alert.        No active synovitis.     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  2.    Procedures :Actemra 250 mg [5 mg/kg] IV over 1 hour.  The rationale, objectives, risk/reward and potential side effects have been explained to the patient and the patient understands these facts.  The patient further understands that periodic laboratory studies must be performed to monitor this drug and the patient will have these studies performed when the requisition is provided.  The patient is again educated to report to the office adverse effects including allergic reactions or infections.  The patient agreed to this plan.   3.    Laboratory results were reviewed with the patient.All results are excellent. In particular, there has been an excellent response of the mixed hyperlipidemia to ATORVASTATIN therapy. Patient tolerates ATORVASTATIN well.  4.    Return to office in two months.  5.    All queries addressed.      Diego Burgos MD

## 2024-06-18 NOTE — PROGRESS NOTES
[facility]   Infusion Clinic Note   Date: 2024   Name: Bria Muniz  : 1985   MRN: [id number]         Reason for Visit: OP Infusion (Actemra)         Today: We administered tocilizumab (Actemra) 250 mg in sodium chloride 0.9% 100 mL IV.       Visit Type: INFUSION- Maintenance every 28 days.        Ordered By: Dr. Burgos      Accompanied by:Self      Diagnosis: Seropositive rheumatoid arthritis of multiple sites (Multi)       Allergies:   Allergies as of 2024 - Reviewed 2024   Allergen Reaction Noted    Penicillins Hives 2023    Tramadol Dizziness 2023         Current Medications has a current medication list which includes the following prescription(s): acetaminophen, atorvastatin, fluocinonide, ibuprofen, mirena, and actemra.       Vitals:   Vitals:    24 1304 24 1425   BP: 126/70 102/69   Pulse: 97 81   Resp: 16 17   Temp: 36.9 °C (98.5 °F) 37.1 °C (98.7 °F)   TempSrc: Temporal    SpO2: 100%    Weight: 50.1 kg (110 lb 8 oz)    PainSc: 0-No pain    LMP: 2024             Infusion Pre-procedure Checklist:   - Allergies reviewed: yes   - Medications reviewed: yes       - Previous reaction to current treatment: no      Assess patient for the concerns below. Document provider notification as appropriate.  - Active or recent infection with/without current antibiotic use: no  - Recent or planned invasive dental work: no  - Recent or planned surgeries: no  - Recently received or plans to receive vaccinations: no  - Has treatment related toxicities: no  - Is pregnant:  no, LMP 2024-  Urine HCG negative today      Pain: 0   - Is the pain different from normal: no   - Is the pain tolerable: n/a   - Is your Doctor aware:  n/a      Labs:  reviewed         Fall Risk Screening: Sakshi Fall Risk  History of Falling, Immediate or Within 3 Months: No  Secondary Diagnosis: Yes  Ambulatory Aid: Walks without aid/bedrest/nurse assist  Intravenous  Therapy/Heparin Lock: No  Gait/Transferring: Normal/bedrest/immobile  Mental Status: Oriented to own ability  Cantor Fall Risk Score: 15       Review Of Systems:  Review of Systems   Constitutional:  Positive for appetite change.        Patient reports RA well controlled with current treatment. Eating and sleeping well.   Musculoskeletal:  Positive for arthralgias.   All other systems reviewed and are negative.        Infusion Readiness:   - Assessment Concerns Related to Infusion: No  - Provider notified: no      Document Below Only If Indicated:   New Patient Education:    N/A (returning patient for continuation of therapy. Ongoing education provided as needed.)        Treatment Conditions & Drug Specific Questions:    Tocilizumab  (ACTEMRA)   (Unless otherwise specified on patient specific therapy plan):     TREATMENT CONDITIONS:  Unless otherwise specified on patient specific therapy plan HOLD and notify provider prior to proceeding with treatment if:   o Platelets LESS than 100 x 10E9L  o ANC LESS than 2 x 10E9/L  o ALT GREATER than 1.5x ULN and/or AST GREATER than 1.5x ULN  o Positive T-spot  o Reactive Hepatitis B Surface Antigen  -           Positive Pregnancy    Lab Results   Component Value Date     06/12/2024      Lab Results   Component Value Date    NEUTROABS 3.87 06/12/2024        Chemistry    Lab Results   Component Value Date/Time     06/12/2024 1243    K 4.0 06/12/2024 1243     06/12/2024 1243    CO2 22 (L) 06/12/2024 1243    BUN 9 06/12/2024 1243    CREATININE 0.70 06/12/2024 1243    Lab Results   Component Value Date/Time    CALCIUM 9.7 06/12/2024 1243    ALKPHOS 44 04/17/2024 1014    AST 27 04/17/2024 1014    ALT 21 04/17/2024 1014    BILITOT 1.0 04/17/2024 1014        ,  Lab Results   Component Value Date    ALT 21 04/17/2024    AST 27 04/17/2024    ALKPHOS 44 04/17/2024    BILITOT 1.0 04/17/2024      Lab Results   Component Value Date    TBSIN Negative 12/04/2023    TBGRES  "Negative  Reference range: NEGATIVE   02/07/2022    TSPOTR  05/17/2023     Negative  Reference range: Normal Value: Negative    A negative test result does not exclude the possibility of exposure  to   or infection with Mycobacterium tuberculosis (M. tuberculosis).    Patients with recent exposure to TB infected individuals exhibiting a   negative T-SPOT.TB result should be considered for retesting within 6   weeks or if other relevant clinical symptoms indicate.  Results from   T-SPOT.TB testing must be used in conjunction with each individual's   epidemiological history, current medical status, and results of other   diagnostic evaluations.  The T-SPOT.TB test is qualitative and  results   are reported as positive, borderline or negative, given that the test   controls perform as expected. In line with the Centers for Disease   Control and Prevention's 2010 recommendation to report quantitative   measurements alongside the qualitative result, the laboratory  provides   spot counts for informational purposes only.  The T-SPOT.TB test  should   not be interpreted as a quantitative test.  Test performed at:  [place] [address]      No results found for: \"NONUHFIRE\", \"NONUHSWGH\", \"NONUHFISH\", \"EXTHEPBSAG\"  Lab Results   Component Value Date    HEPBSAG Nonreactive 12/04/2023      Lab Results   Component Value Date    HEPAIGM Nonreactive 12/04/2023    HEPBCIGM Nonreactive 12/04/2023    HEPCAB Nonreactive 12/04/2023         Labs reviewed and patient meets treatment conditions? Yes    DRUG SPECIFIC QUESTIONS:  Any history of or new diagnosis of GI perforation? No  (Actemra may increase risk of bowel perforation)      REMINDERS:  PREGNANCY CATEGORY X DRUG. OBTAIN NEGTATIVE PREGNANCY TEST PRIOR TO FIRST INFUSION FOR WOMEN OF CHILDBEARING ABILITY   WEIGHT BASED DRUG     Recommended Vitals/Observation:  Vitals: Monitor patient's vital signs prior to infusion start, every 30 minutes during infusion " and 30 minutes after infusion.   Observation: Patient may leave 30 minutes post infusion.        Weight Based Drug Calculations:    WEIGHT BASED DRUGS: Tocilizumab (ACTEMRA)   Patient's dosing weight (kg): 50.0     10% weight variance for prescribed treatment: 45.0 kg to 55.0 kg     Patient's weight today:   Vitals:    06/18/24 1304   Weight: 50.1 kg (110 lb 8 oz)         weight range for prescribed dose:     Patient weight today falls outside of 10% variance or  weight range: No     Home Care pharmacist informed of weight variance: No    Doses that are weight based have an acceptable variance rule within 10% of the prescribed   order and/or within  weight range. If patient weight on day of infusion falls   outside of the 10% variance, or weight range, infusion is administered and   pharmacy contacted regarding future dosing adjustments, per policy.      1320 Urine HCG negative today.  1445 Patient tolerated infusion well.  No s/s adverse reaction noted.  Patient declining to stay for 30 minute post infusion observation.  VSS.  RTC in 28 days.     Initiated By: Kenia Arriaga RN

## 2024-06-20 NOTE — PROGRESS NOTES
"Bria Muniz     Chief Complaint:  This 37 y.o. year old female with seropositive rheumatoid arthritis (RA) presents for treatment with ACTEMRA.    HPI  Subjective:  Prob. #1: RA       The patient returns for her monthly treatment of RA with ACTEMRA.        Since her previous visit on 9/28/2023, the patient has been well. She denies any interval infections or illnesses requiring treatment.        The patient also reports that the RA is entirely asymptomatic. She has had no new joint pain, joint swelling, loss of joint mobility or new joint deformities. There are no rheumatoid subcutaneous nodules.         The patient is pleased with the outcome of her ACTEMRA treatments. She has often mentioned that delays of a month or more are associated with return of RA symptoms, so she understands the importance of having her treatments on time.  Moreover, the patient denies any adverse drug effects whatsoever.    Review of Systems   All other systems reviewed and are negative.        Objective:  Vital signs:  Vitals:    11/07/23 0932   BP: 100/62   Pulse: 79   SpO2: 99%   Weight: 52.2 kg (115 lb)   Height: 1.448 m (4' 9\")   PainSc: 0-No pain       Physical Exam  Vitals and nursing note reviewed.   Constitutional:       Appearance: Normal appearance. She is normal weight.   HENT:      Head: Normocephalic.      Nose: Nose normal.      Mouth/Throat:      Mouth: Mucous membranes are moist.      Pharynx: Oropharynx is clear.   Eyes:      Extraocular Movements: Extraocular movements intact.      Conjunctiva/sclera: Conjunctivae normal.      Pupils: Pupils are equal, round, and reactive to light.   Neck:      Vascular: No carotid bruit.   Cardiovascular:      Rate and Rhythm: Normal rate and regular rhythm.      Pulses: Normal pulses.      Heart sounds: Normal heart sounds. No murmur heard.  Pulmonary:      Effort: Pulmonary effort is normal.      Breath sounds: Normal breath sounds. No rales.   Abdominal:      General: Abdomen is " Calcium score 93.  Calcium localized mainly to the left main.  No anginal symptoms.  Continue statin therapy.   flat. Bowel sounds are normal.      Palpations: Abdomen is soft.   Musculoskeletal:         General: Normal range of motion.      Cervical back: Normal range of motion and neck supple.   Lymphadenopathy:      Cervical: No cervical adenopathy.   Skin:     General: Skin is warm.      Capillary Refill: Capillary refill takes less than 2 seconds.      Findings: No bruising or rash.   Neurological:      General: No focal deficit present.      Mental Status: She is alert and oriented to person, place, and time. Mental status is at baseline.   Psychiatric:         Mood and Affect: Mood normal.         Behavior: Behavior normal.        No active synovitis, newly developing deformities, old deformities, or subcutaneous nodules.  Assessment:  Rheumatoid arthritis of multiple sites w/o organ or system involvement with positive rheumatoid factor - M05.79  Defects in the complement system - D84.1  Macrocytosis without anemia - D75.89  Long term drug therapy - Z79.899    Plan:  RA: The patient can be treated today. Actemra 314 mg [6 mg/kg] IV over 1 hour.  The rationale, objectives, risk/reward and potential side effects have been explained to the patient and the patient understands these facts.  The patient further understands that periodic laboratory studies must be performed to monitor this drug and the patient will have these studies performed when the requisition is provided.  The patient is again educated to report to the office adverse effects including allergic reactions or infections.  The patient agreed to this plan.   Laboratory work to be done prior to next visit.      Diego Burgos MD

## 2024-07-16 ENCOUNTER — APPOINTMENT (OUTPATIENT)
Dept: INFUSION THERAPY | Facility: CLINIC | Age: 39
End: 2024-07-16
Payer: COMMERCIAL

## 2024-07-16 VITALS
DIASTOLIC BLOOD PRESSURE: 70 MMHG | SYSTOLIC BLOOD PRESSURE: 110 MMHG | BODY MASS INDEX: 22.2 KG/M2 | RESPIRATION RATE: 16 BRPM | OXYGEN SATURATION: 98 % | WEIGHT: 109.9 LBS | HEART RATE: 98 BPM | TEMPERATURE: 98.2 F

## 2024-07-16 DIAGNOSIS — M05.79 SEROPOSITIVE RHEUMATOID ARTHRITIS OF MULTIPLE SITES (MULTI): ICD-10-CM

## 2024-07-16 PROCEDURE — 96365 THER/PROPH/DIAG IV INF INIT: CPT | Performed by: NURSE PRACTITIONER

## 2024-07-16 RX ORDER — FAMOTIDINE 10 MG/ML
20 INJECTION INTRAVENOUS ONCE AS NEEDED
OUTPATIENT
Start: 2024-08-13

## 2024-07-16 RX ORDER — ALBUTEROL SULFATE 0.83 MG/ML
3 SOLUTION RESPIRATORY (INHALATION) AS NEEDED
OUTPATIENT
Start: 2024-08-13

## 2024-07-16 RX ORDER — DIPHENHYDRAMINE HYDROCHLORIDE 50 MG/ML
50 INJECTION INTRAMUSCULAR; INTRAVENOUS AS NEEDED
OUTPATIENT
Start: 2024-08-13

## 2024-07-16 RX ORDER — EPINEPHRINE 0.3 MG/.3ML
0.3 INJECTION SUBCUTANEOUS EVERY 5 MIN PRN
OUTPATIENT
Start: 2024-08-13

## 2024-07-16 ASSESSMENT — ENCOUNTER SYMPTOMS
ARTHRALGIAS: 1
APPETITE CHANGE: 1

## 2024-07-16 ASSESSMENT — PAIN SCALES - GENERAL: PAINLEVEL: 0-NO PAIN

## 2024-07-16 NOTE — PROGRESS NOTES
[facility]   Infusion Clinic Note   Date: 2024   Name: Bria Muniz  : 1985   MRN: [id number]         Reason for Visit: OP Infusion (Actemra)         Today: We administered tocilizumab (Actemra) 250 mg in sodium chloride 0.9% 100 mL IV.       Visit Type: INFUSION- Maintenance every 28 days.        Ordered By: Dr. Burgos      Accompanied by:Self      Diagnosis: Seropositive rheumatoid arthritis of multiple sites (Multi)       Allergies:   Allergies as of 2024 - Reviewed 2024   Allergen Reaction Noted    Penicillins Hives 2023    Tramadol Dizziness 2023         Current Medications has a current medication list which includes the following prescription(s): acetaminophen, atorvastatin, fluocinonide, ibuprofen, mirena, and actemra, and the following Facility-Administered Medications: tocilizumab (Actemra) 250 mg in sodium chloride 0.9% 100 mL IV.       Vitals:   Vitals:    24 1104   BP: 119/82   Pulse: 97   Resp: 16   Temp: 36.8 °C (98.2 °F)   SpO2: 100%   Weight: 49.9 kg (109 lb 14.4 oz)   PainSc: 0-No pain   LMP: 2024             Infusion Pre-procedure Checklist:   - Allergies reviewed: yes   - Medications reviewed: yes       - Previous reaction to current treatment: no      Assess patient for the concerns below. Document provider notification as appropriate.  - Active or recent infection with/without current antibiotic use: no  - Recent or planned invasive dental work: no  - Recent or planned surgeries: no  - Recently received or plans to receive vaccinations: no  - Has treatment related toxicities: no  - Is pregnant:  no, LMP 2024-  Urine HCG negative today      Pain: 0   - Is the pain different from normal: no   - Is the pain tolerable: n/a   - Is your Doctor aware:  n/a      Labs:  reviewed         Fall Risk Screening: Sakshi Fall Risk  History of Falling, Immediate or Within 3 Months: No  Secondary Diagnosis: Yes  Ambulatory Aid:  Walks without aid/bedrest/nurse assist  Intravenous Therapy/Heparin Lock: Yes  Gait/Transferring: Normal/bedrest/immobile  Mental Status: Oriented to own ability  Cantor Fall Risk Score: 35       Review Of Systems:  Review of Systems   Constitutional:  Positive for appetite change.        Patient reports RA well controlled with current treatment. Eating and sleeping well.   Musculoskeletal:  Positive for arthralgias.   All other systems reviewed and are negative.        Infusion Readiness:   - Assessment Concerns Related to Infusion: No  - Provider notified: no      Document Below Only If Indicated:   New Patient Education:    N/A (returning patient for continuation of therapy. Ongoing education provided as needed.)        Treatment Conditions & Drug Specific Questions:    Tocilizumab  (ACTEMRA)   (Unless otherwise specified on patient specific therapy plan):     TREATMENT CONDITIONS:  Unless otherwise specified on patient specific therapy plan HOLD and notify provider prior to proceeding with treatment if:   o Platelets LESS than 100 x 10E9L  o ANC LESS than 2 x 10E9/L  o ALT GREATER than 1.5x ULN and/or AST GREATER than 1.5x ULN  o Positive T-spot  o Reactive Hepatitis B Surface Antigen  -           Positive Pregnancy    Lab Results   Component Value Date     06/12/2024      Lab Results   Component Value Date    NEUTROABS 3.87 06/12/2024        Chemistry    Lab Results   Component Value Date/Time     06/12/2024 1243    K 4.0 06/12/2024 1243     06/12/2024 1243    CO2 22 (L) 06/12/2024 1243    BUN 9 06/12/2024 1243    CREATININE 0.70 06/12/2024 1243    Lab Results   Component Value Date/Time    CALCIUM 9.7 06/12/2024 1243    ALKPHOS 44 04/17/2024 1014    AST 27 04/17/2024 1014    ALT 21 04/17/2024 1014    BILITOT 1.0 04/17/2024 1014        ,  Lab Results   Component Value Date    ALT 21 04/17/2024    AST 27 04/17/2024    ALKPHOS 44 04/17/2024    BILITOT 1.0 04/17/2024      Lab Results   Component  "Value Date    TBSIN Negative 12/04/2023    TBGRES Negative  Reference range: NEGATIVE   02/07/2022    TSPOTR  05/17/2023     Negative  Reference range: Normal Value: Negative    A negative test result does not exclude the possibility of exposure  to   or infection with Mycobacterium tuberculosis (M. tuberculosis).    Patients with recent exposure to TB infected individuals exhibiting a   negative T-SPOT.TB result should be considered for retesting within 6   weeks or if other relevant clinical symptoms indicate.  Results from   T-SPOT.TB testing must be used in conjunction with each individual's   epidemiological history, current medical status, and results of other   diagnostic evaluations.  The T-SPOT.TB test is qualitative and  results   are reported as positive, borderline or negative, given that the test   controls perform as expected. In line with the Centers for Disease   Control and Prevention's 2010 recommendation to report quantitative   measurements alongside the qualitative result, the laboratory  provides   spot counts for informational purposes only.  The T-SPOT.TB test  should   not be interpreted as a quantitative test.  Test performed at:  [place] [address]      No results found for: \"NONUHFIRE\", \"NONUHSWGH\", \"NONUHFISH\", \"EXTHEPBSAG\"  Lab Results   Component Value Date    HEPBSAG Nonreactive 12/04/2023      Lab Results   Component Value Date    HEPAIGM Nonreactive 12/04/2023    HEPBCIGM Nonreactive 12/04/2023    HEPCAB Nonreactive 12/04/2023         Labs reviewed and patient meets treatment conditions? Yes    DRUG SPECIFIC QUESTIONS:  Any history of or new diagnosis of GI perforation? No  (Actemra may increase risk of bowel perforation)      REMINDERS:  PREGNANCY CATEGORY X DRUG. OBTAIN NEGTATIVE PREGNANCY TEST PRIOR TO FIRST INFUSION FOR WOMEN OF CHILDBEARING ABILITY   WEIGHT BASED DRUG     Recommended Vitals/Observation:  Vitals: Monitor patient's vital signs prior to " infusion start, every 30 minutes during infusion and 30 minutes after infusion.   Observation: Patient may leave 30 minutes post infusion.        Weight Based Drug Calculations:    WEIGHT BASED DRUGS: Tocilizumab (ACTEMRA)   Patient's dosing weight (kg): 50.0     10% weight variance for prescribed treatment: 45.0 kg to 55.0 kg     Patient's weight today:   Vitals:    07/16/24 1104   Weight: 49.9 kg (109 lb 14.4 oz)         weight range for prescribed dose:     Patient weight today falls outside of 10% variance or  weight range: No     Home Care pharmacist informed of weight variance: No    Doses that are weight based have an acceptable variance rule within 10% of the prescribed   order and/or within  weight range. If patient weight on day of infusion falls   outside of the 10% variance, or weight range, infusion is administered and   pharmacy contacted regarding future dosing adjustments, per policy.       Urine HCG negative today.  1245 Patient tolerated infusion well.  No s/s adverse reaction noted.  Patient declining to stay for 30 minute post infusion observation.  VSS.  RTC in 28 days.     Initiated By: Teresa Neumann RN

## 2024-07-25 ENCOUNTER — HOSPITAL ENCOUNTER (EMERGENCY)
Facility: HOSPITAL | Age: 39
Discharge: HOME | End: 2024-07-26
Attending: EMERGENCY MEDICINE
Payer: COMMERCIAL

## 2024-07-25 DIAGNOSIS — F10.929 ALCOHOLIC INTOXICATION WITH COMPLICATION (CMS-HCC): Primary | ICD-10-CM

## 2024-07-25 PROCEDURE — 99283 EMERGENCY DEPT VISIT LOW MDM: CPT | Performed by: EMERGENCY MEDICINE

## 2024-07-25 ASSESSMENT — COLUMBIA-SUICIDE SEVERITY RATING SCALE - C-SSRS
1. IN THE PAST MONTH, HAVE YOU WISHED YOU WERE DEAD OR WISHED YOU COULD GO TO SLEEP AND NOT WAKE UP?: NO
6. HAVE YOU EVER DONE ANYTHING, STARTED TO DO ANYTHING, OR PREPARED TO DO ANYTHING TO END YOUR LIFE?: NO
2. HAVE YOU ACTUALLY HAD ANY THOUGHTS OF KILLING YOURSELF?: NO

## 2024-07-25 ASSESSMENT — PAIN - FUNCTIONAL ASSESSMENT: PAIN_FUNCTIONAL_ASSESSMENT: UNABLE TO SELF-REPORT

## 2024-07-26 ENCOUNTER — APPOINTMENT (OUTPATIENT)
Dept: CARDIOLOGY | Facility: HOSPITAL | Age: 39
End: 2024-07-26
Payer: COMMERCIAL

## 2024-07-26 VITALS
DIASTOLIC BLOOD PRESSURE: 82 MMHG | HEART RATE: 89 BPM | OXYGEN SATURATION: 97 % | WEIGHT: 110 LBS | RESPIRATION RATE: 14 BRPM | HEIGHT: 64 IN | BODY MASS INDEX: 18.78 KG/M2 | TEMPERATURE: 97.5 F | SYSTOLIC BLOOD PRESSURE: 110 MMHG

## 2024-07-26 PROBLEM — F10.929 ALCOHOLIC INTOXICATION WITH COMPLICATION (CMS-HCC): Status: ACTIVE | Noted: 2024-07-26

## 2024-07-26 LAB
ALBUMIN SERPL-MCNC: 4.7 G/DL (ref 3.5–5)
ALP BLD-CCNC: 41 U/L (ref 35–125)
ALT SERPL-CCNC: 36 U/L (ref 5–40)
AMPHETAMINES UR QL SCN>1000 NG/ML: NEGATIVE
ANION GAP SERPL CALC-SCNC: 17 MMOL/L
APPEARANCE UR: CLEAR
AST SERPL-CCNC: 48 U/L (ref 5–40)
BARBITURATES UR QL SCN>300 NG/ML: NEGATIVE
BASOPHILS # BLD AUTO: 0.07 X10*3/UL (ref 0–0.1)
BASOPHILS NFR BLD AUTO: 0.9 %
BENZODIAZ UR QL SCN>300 NG/ML: NEGATIVE
BILIRUB SERPL-MCNC: 0.2 MG/DL (ref 0.1–1.2)
BILIRUB UR STRIP.AUTO-MCNC: NEGATIVE MG/DL
BUN SERPL-MCNC: 8 MG/DL (ref 8–25)
BZE UR QL SCN>300 NG/ML: NEGATIVE
CALCIUM SERPL-MCNC: 9.1 MG/DL (ref 8.5–10.4)
CANNABINOIDS UR QL SCN>50 NG/ML: NEGATIVE
CHLORIDE SERPL-SCNC: 107 MMOL/L (ref 97–107)
CO2 SERPL-SCNC: 18 MMOL/L (ref 24–31)
COLOR UR: COLORLESS
CREAT SERPL-MCNC: 0.6 MG/DL (ref 0.4–1.6)
EGFRCR SERPLBLD CKD-EPI 2021: >90 ML/MIN/1.73M*2
EOSINOPHIL # BLD AUTO: 0.14 X10*3/UL (ref 0–0.7)
EOSINOPHIL NFR BLD AUTO: 1.7 %
ERYTHROCYTE [DISTWIDTH] IN BLOOD BY AUTOMATED COUNT: 11.9 % (ref 11.5–14.5)
ETHANOL SERPL-MCNC: 0.45 G/DL
FENTANYL+NORFENTANYL UR QL SCN: NEGATIVE
GLUCOSE SERPL-MCNC: 130 MG/DL (ref 65–99)
GLUCOSE UR STRIP.AUTO-MCNC: NORMAL MG/DL
HCT VFR BLD AUTO: 41.2 % (ref 36–46)
HGB BLD-MCNC: 13.9 G/DL (ref 12–16)
HOLD SPECIMEN: NORMAL
IMM GRANULOCYTES # BLD AUTO: 0.03 X10*3/UL (ref 0–0.7)
IMM GRANULOCYTES NFR BLD AUTO: 0.4 % (ref 0–0.9)
KETONES UR STRIP.AUTO-MCNC: NEGATIVE MG/DL
LEUKOCYTE ESTERASE UR QL STRIP.AUTO: NEGATIVE
LYMPHOCYTES # BLD AUTO: 3.75 X10*3/UL (ref 1.2–4.8)
LYMPHOCYTES NFR BLD AUTO: 46.4 %
MAGNESIUM SERPL-MCNC: 2.6 MG/DL (ref 1.6–3.1)
MCH RBC QN AUTO: 34 PG (ref 26–34)
MCHC RBC AUTO-ENTMCNC: 33.7 G/DL (ref 32–36)
MCV RBC AUTO: 101 FL (ref 80–100)
METHADONE UR QL SCN>300 NG/ML: NEGATIVE
MONOCYTES # BLD AUTO: 0.78 X10*3/UL (ref 0.1–1)
MONOCYTES NFR BLD AUTO: 9.6 %
NEUTROPHILS # BLD AUTO: 3.32 X10*3/UL (ref 1.2–7.7)
NEUTROPHILS NFR BLD AUTO: 41 %
NITRITE UR QL STRIP.AUTO: NEGATIVE
NRBC BLD-RTO: 0 /100 WBCS (ref 0–0)
OPIATES UR QL SCN>300 NG/ML: NEGATIVE
OXYCODONE UR QL: NEGATIVE
PCP UR QL SCN>25 NG/ML: NEGATIVE
PH UR STRIP.AUTO: 5.5 [PH]
PLATELET # BLD AUTO: 349 X10*3/UL (ref 150–450)
POTASSIUM SERPL-SCNC: 3.4 MMOL/L (ref 3.4–5.1)
PROT SERPL-MCNC: 7.2 G/DL (ref 5.9–7.9)
PROT UR STRIP.AUTO-MCNC: NEGATIVE MG/DL
RBC # BLD AUTO: 4.09 X10*6/UL (ref 4–5.2)
RBC # UR STRIP.AUTO: NEGATIVE /UL
SODIUM SERPL-SCNC: 142 MMOL/L (ref 133–145)
SP GR UR STRIP.AUTO: 1
UROBILINOGEN UR STRIP.AUTO-MCNC: NORMAL MG/DL
WBC # BLD AUTO: 8.1 X10*3/UL (ref 4.4–11.3)

## 2024-07-26 PROCEDURE — 80307 DRUG TEST PRSMV CHEM ANLYZR: CPT | Performed by: EMERGENCY MEDICINE

## 2024-07-26 PROCEDURE — 85025 COMPLETE CBC W/AUTO DIFF WBC: CPT | Performed by: EMERGENCY MEDICINE

## 2024-07-26 PROCEDURE — 2500000004 HC RX 250 GENERAL PHARMACY W/ HCPCS (ALT 636 FOR OP/ED): Performed by: EMERGENCY MEDICINE

## 2024-07-26 PROCEDURE — 96360 HYDRATION IV INFUSION INIT: CPT | Performed by: EMERGENCY MEDICINE

## 2024-07-26 PROCEDURE — 80053 COMPREHEN METABOLIC PANEL: CPT | Performed by: EMERGENCY MEDICINE

## 2024-07-26 PROCEDURE — 93005 ELECTROCARDIOGRAM TRACING: CPT

## 2024-07-26 PROCEDURE — 82077 ASSAY SPEC XCP UR&BREATH IA: CPT | Performed by: EMERGENCY MEDICINE

## 2024-07-26 PROCEDURE — 36415 COLL VENOUS BLD VENIPUNCTURE: CPT | Performed by: EMERGENCY MEDICINE

## 2024-07-26 PROCEDURE — 81003 URINALYSIS AUTO W/O SCOPE: CPT | Performed by: EMERGENCY MEDICINE

## 2024-07-26 PROCEDURE — 96361 HYDRATE IV INFUSION ADD-ON: CPT | Performed by: EMERGENCY MEDICINE

## 2024-07-26 PROCEDURE — 83735 ASSAY OF MAGNESIUM: CPT | Performed by: EMERGENCY MEDICINE

## 2024-07-26 NOTE — ED PROVIDER NOTES
HPI   Chief Complaint   Patient presents with    Acute Intoxication       38-year-old female here by squbob from [facility] will be for chief complaint of unresponsiveness.  Apparently the patient was out drinking with some friends when all of a sudden she became unresponsive.  They were worried may be somebody reviewed her drink.   later arrives and states that she drinks a couple drinks a day but nothing crazy.  Patient is completely somnolent and unable to answer questions at this time.  She did not fall there was no trauma to her and her friends were with her the whole time.              Patient History   Past Medical History:   Diagnosis Date    Seropositive rheumatoid arthritis of multiple sites (Multi) 10/27/2023     Past Surgical History:   Procedure Laterality Date    EXTERNAL EAR SURGERY       Family History   Problem Relation Name Age of Onset    Other (dry eye) Mother      Diabetes type II Father      No Known Problems Sister      Diabetes type II Brother      Other (dry eye) Mother's Sister      Diabetes type II Mother's Sister      Hypertension Mother's Sister       Social History     Tobacco Use    Smoking status: Never     Passive exposure: Never    Smokeless tobacco: Never   Vaping Use    Vaping status: Never Used   Substance Use Topics    Alcohol use: Yes     Alcohol/week: 1.0 standard drink of alcohol     Types: 1 Glasses of wine per week     Comment: social    Drug use: Never       Physical Exam   ED Triage Vitals [07/25/24 2355]   Temperature Heart Rate Respirations BP   36.4 °C (97.5 °F) (!) 107 16 112/78      Pulse Ox Temp Source Heart Rate Source Patient Position   (!) 93 % Temporal Monitor Lying      BP Location FiO2 (%)     Right arm --       Physical Exam  Vitals and nursing note reviewed.   Constitutional:       Appearance: Normal appearance.      Comments: Patient is sleeping on examination   HENT:      Head: Normocephalic and atraumatic.      Nose: Nose normal.      Mouth/Throat:       Mouth: Mucous membranes are moist.   Eyes:      Extraocular Movements: Extraocular movements intact.      Pupils: Pupils are equal, round, and reactive to light.   Cardiovascular:      Rate and Rhythm: Regular rhythm. Tachycardia present.   Pulmonary:      Effort: Pulmonary effort is normal.      Breath sounds: Normal breath sounds.   Abdominal:      General: Abdomen is flat.      Palpations: Abdomen is soft.   Musculoskeletal:      Cervical back: Normal range of motion.   Skin:     General: Skin is warm and dry.      Capillary Refill: Capillary refill takes less than 2 seconds.   Neurological:      Comments: Too somnolent to do physical examination           ED Course & MDM   Diagnoses as of 07/26/24 0339   Alcoholic intoxication with complication (CMS-HCC)                       [place] Coma Scale Score: 12                        Medical Decision Making      Medical Decision Making: Patient was given 2 L of fluids, lab work is reassuring at this time except for an alcohol level of 0.448.  Urine drug screen is negative.  On repeat evaluation the patient is awake at times and her  would like to take her home now.  I do feel she can be safely discharged with a safe ride he is going to stay with her for the rest of the day.  She can be discharged home with very close follow-up.  [unfilled]     EKG interpreted by myself (ED attending physician): Heart rate is 102 sinus tachycardia nonspecific ST-T wave abnormality normal axis and intervals otherwise.    Differential Diagnoses Considered: Alcohol and tox drug and tox     Chronic Medical Conditions Significantly Affecting Care: none    External Records Reviewed: I reviewed recent and relevant outside records including: previous labs    Social Determinants of Health Significantly Affecting Care: lives with     Diagnostic testing considered: ct head    Shiloh Armas D.O.  Emergency Medicine          Procedure  Procedures     Shiloh HERNANDEZ  DO Pawel  07/26/24 0337

## 2024-07-29 LAB
ATRIAL RATE: 102 BPM
P AXIS: 55 DEGREES
P OFFSET: 193 MS
P ONSET: 148 MS
PR INTERVAL: 152 MS
Q ONSET: 224 MS
QRS COUNT: 16 BEATS
QRS DURATION: 74 MS
QT INTERVAL: 368 MS
QTC CALCULATION(BAZETT): 479 MS
QTC FREDERICIA: 439 MS
R AXIS: 86 DEGREES
T AXIS: 62 DEGREES
T OFFSET: 408 MS
VENTRICULAR RATE: 102 BPM

## 2024-08-13 ENCOUNTER — APPOINTMENT (OUTPATIENT)
Dept: INFUSION THERAPY | Facility: CLINIC | Age: 39
End: 2024-08-13
Payer: COMMERCIAL

## 2024-08-13 VITALS
OXYGEN SATURATION: 100 % | BODY MASS INDEX: 19.19 KG/M2 | WEIGHT: 111.8 LBS | HEART RATE: 77 BPM | TEMPERATURE: 98.2 F | RESPIRATION RATE: 18 BRPM | SYSTOLIC BLOOD PRESSURE: 119 MMHG | DIASTOLIC BLOOD PRESSURE: 70 MMHG

## 2024-08-13 DIAGNOSIS — M05.79 SEROPOSITIVE RHEUMATOID ARTHRITIS OF MULTIPLE SITES (MULTI): ICD-10-CM

## 2024-08-13 LAB — PREGNANCY TEST URINE, POC: NEGATIVE

## 2024-08-13 PROCEDURE — 81025 URINE PREGNANCY TEST: CPT | Performed by: NURSE PRACTITIONER

## 2024-08-13 PROCEDURE — 96365 THER/PROPH/DIAG IV INF INIT: CPT | Performed by: NURSE PRACTITIONER

## 2024-08-13 RX ORDER — DIPHENHYDRAMINE HYDROCHLORIDE 50 MG/ML
50 INJECTION INTRAMUSCULAR; INTRAVENOUS AS NEEDED
OUTPATIENT
Start: 2024-09-10

## 2024-08-13 RX ORDER — FAMOTIDINE 10 MG/ML
20 INJECTION INTRAVENOUS ONCE AS NEEDED
OUTPATIENT
Start: 2024-09-10

## 2024-08-13 RX ORDER — EPINEPHRINE 0.3 MG/.3ML
0.3 INJECTION SUBCUTANEOUS EVERY 5 MIN PRN
OUTPATIENT
Start: 2024-09-10

## 2024-08-13 RX ORDER — ALBUTEROL SULFATE 0.83 MG/ML
3 SOLUTION RESPIRATORY (INHALATION) AS NEEDED
OUTPATIENT
Start: 2024-09-10

## 2024-08-13 ASSESSMENT — ENCOUNTER SYMPTOMS
FATIGUE: 0
MYALGIAS: 0
ARTHRALGIAS: 0

## 2024-08-13 ASSESSMENT — PAIN SCALES - GENERAL: PAINLEVEL: 0-NO PAIN

## 2024-08-13 NOTE — PATIENT INSTRUCTIONS
Today :Bria Muniz had no medications administered during this visit.     For:   1. Seropositive rheumatoid arthritis of multiple sites (Multi)         Your next appointment is due in:  9/11/2024        Please read the  Medication Guide that was given to you and reviewed during todays visit.     (Tell all doctors including dentists that you are taking this medication)     Go to the emergency room or call 911 if:  -You have signs of allergic reaction:   -Rash, hives, itching.   -Swollen, blistered, peeling skin.   -Swelling of face, lips, mouth, tongue or throat.   -Tightness of chest, trouble breathing, swallowing or talking     Call your doctor:  - If IV / injection site gets red, warm, swollen, itchy or leaks fluid or pus.     (Leave dressing on your IV site for at least 2 hours and keep area clean and dry  - If you get sick or have symptoms of infection or are not feeling well for any reason.    (Wash your hands often, stay away from people who are sick)  - If you have side effects from your medication that do not go away or are bothersome.     (Refer to the teaching your nurse gave you for side effects to call your doctor about)    - Common side effects may include:  stuffy nose, headache, feeling tired, muscle aches, upset stomach  - Before receiving any vaccines     - Call the [facility] at   If:  - You get sick, are on antibiotics, have had a recent vaccine, have surgery or dental work and your doctor wants your visit rescheduled.  - You need to cancel and reschedule your visit for any reason. Call at least 2 days before your visit if you need to cancel.   - Your insurance changes before your next visit.    (We will need to get approval from your new insurance. This can take up to two weeks.)     The [facility] is opened Monday thru Friday. We are closed on weekends and holidays.   Voice mail will take your call if the center is closed. If you leave a message please  allow 24 hours for a call back during weekdays. If you leave a message on a weekend/holiday, we will call you back the next business day.

## 2024-08-13 NOTE — PROGRESS NOTES
[facility]   Infusion Clinic Note   Date: 2024   Name: Bria Muniz  : 1985   MRN: [id number]          Reason for Visit: OP Infusion (Actemra)         Today: We administered tocilizumab (Actemra) 250 mg in sodium chloride 0.9% 100 mL IV.       Visit Type: INFUSION- Maintenance -every 28 days       Ordered By: Dr. Burgos       Accompanied by:Self       Diagnosis: Seropositive rheumatoid arthritis of multiple sites (Multi)        Allergies:   Allergies as of 2024 - Reviewed 2024   Allergen Reaction Noted    Penicillins Hives 2023    Tramadol Dizziness 2023          Current Medications has a current medication list which includes the following prescription(s): acetaminophen, atorvastatin, fluocinonide, ibuprofen, mirena, and actemra.       Vitals:   Vitals:    24 1101 24 1241 24 1300   BP: 116/76 115/68 119/70   Pulse: 83 78 77   Resp: 16 18 18   Temp: 36.7 °C (98 °F) 36.7 °C (98.1 °F) 36.8 °C (98.2 °F)   TempSrc: Temporal     SpO2: 100%     Weight: 50.7 kg (111 lb 12.8 oz)     PainSc: 0-No pain     LMP: 2024             Infusion Pre-procedure Checklist:   - Allergies reviewed: yes   - Medications reviewed: yes       - Previous reaction to current treatment: no      Assess patient for the concerns below. Document provider notification as appropriate.  - Active or recent infection with/without current antibiotic use: no  - Recent or planned invasive dental work: no  - Recent or planned surgeries: no  - Recently received or plans to receive vaccinations: no  - Has treatment related toxicities: no  - Is pregnant:  no,  Urine HCG negative today      Pain: 0   - Is the pain different from normal: no   - Is the pain tolerable: n/a   - Is your Doctor aware:  n/a       Labs:  reviewed          Fall Risk Screening: Sakshi Fall Risk  History of Falling, Immediate or Within 3 Months: No  Secondary Diagnosis: Yes  Ambulatory Aid: Walks  without aid/bedrest/nurse assist  Intravenous Therapy/Heparin Lock: No  Gait/Transferring: Normal/bedrest/immobile  Mental Status: Oriented to own ability  [place] Fall Risk Score: 15       Review Of Systems:  Review of Systems   Constitutional:  Negative for fatigue.   Musculoskeletal:  Negative for arthralgias and myalgias.   All other systems reviewed and are negative.           ROS completed? Yes      Infusion Readiness:  - Assessment Concerns Related to Infusion: No  - Provider notified: no      Document Below Only If Indicated:   New Patient Education:    N/A (returning patient for continuation of therapy. Ongoing education provided as needed.)        Treatment Conditions & Drug Specific Questions:    Tocilizumab  (ACTEMRA)   (Unless otherwise specified on patient specific therapy plan):     TREATMENT CONDITIONS:  Unless otherwise specified on patient specific therapy plan HOLD and notify provider prior to proceeding with treatment if:   o Platelets LESS than 100 x 10E9L  o ANC LESS than 2 x 10E9/L  o ALT GREATER than 1.5x ULN and/or AST GREATER than 1.5x ULN  o Positive T-spot  o Reactive Hepatitis B Surface Antigen  -           Positive Pregnancy    Lab Results   Component Value Date     07/26/2024      Lab Results   Component Value Date    NEUTROABS 3.32 07/26/2024        Chemistry    Lab Results   Component Value Date/Time     07/26/2024 0001    K 3.4 07/26/2024 0001     07/26/2024 0001    CO2 18 (L) 07/26/2024 0001    BUN 8 07/26/2024 0001    CREATININE 0.60 07/26/2024 0001    Lab Results   Component Value Date/Time    CALCIUM 9.1 07/26/2024 0001    ALKPHOS 41 07/26/2024 0001    AST 48 (H) 07/26/2024 0001    ALT 36 07/26/2024 0001    BILITOT 0.2 07/26/2024 0001        ,  Lab Results   Component Value Date    ALT 36 07/26/2024    AST 48 (H) 07/26/2024    ALKPHOS 41 07/26/2024    BILITOT 0.2 07/26/2024      Lab Results   Component Value Date    TBSIN Negative 12/04/2023    TBGRES  "Negative  Reference range: NEGATIVE   02/07/2022    TSPOTR  05/17/2023     Negative  Reference range: Normal Value: Negative    A negative test result does not exclude the possibility of exposure  to   or infection with Mycobacterium tuberculosis (M. tuberculosis).    Patients with recent exposure to TB infected individuals exhibiting a   negative T-SPOT.TB result should be considered for retesting within 6   weeks or if other relevant clinical symptoms indicate.  Results from   T-SPOT.TB testing must be used in conjunction with each individual's   epidemiological history, current medical status, and results of other   diagnostic evaluations.  The T-SPOT.TB test is qualitative and  results   are reported as positive, borderline or negative, given that the test   controls perform as expected. In line with the Centers for Disease   Control and Prevention's 2010 recommendation to report quantitative   measurements alongside the qualitative result, the laboratory  provides   spot counts for informational purposes only.  The T-SPOT.TB test  should   not be interpreted as a quantitative test.  Test performed at:  [place] [id number]      No results found for: \"NONUHFIRE\", \"NONUHSWGH\", \"NONUHFISH\", \"EXTHEPBSAG\"  Lab Results   Component Value Date    HEPBSAG Nonreactive 12/04/2023      Lab Results   Component Value Date    HEPAIGM Nonreactive 12/04/2023    HEPBCIGM Nonreactive 12/04/2023    HEPCAB Nonreactive 12/04/2023         Labs reviewed and patient meets treatment conditions? Yes    DRUG SPECIFIC QUESTIONS:  Any history of or new diagnosis of GI perforation? No  (Actemra may increase risk of bowel perforation)      REMINDERS:  PREGNANCY CATEGORY X DRUG. OBTAIN NEGTATIVE PREGNANCY TEST PRIOR TO FIRST INFUSION FOR WOMEN OF CHILDBEARING ABILITY   WEIGHT BASED DRUG     Recommended Vitals/Observation:  Vitals: Monitor patient's vital signs prior to infusion start, every 30 minutes during infusion " and 30 minutes after infusion.   Observation: Patient may leave 30 minutes post infusion.        Weight Based Drug Calculations:    WEIGHT BASED DRUGS: Tocilizumab (ACTEMRA)   Patient's dosing weight (kg): 50     10% weight variance for prescribed treatment: 45 kg to 55 kg     Patient's weight today:   Vitals:    08/13/24 1101   Weight: 50.7 kg (111 lb 12.8 oz)         weight range for prescribed dose:     Patient weight today falls outside of 10% variance or  weight range: No     Home Care pharmacist informed of weight variance: No    Doses that are weight based have an acceptable variance rule within 10% of the prescribed   order and/or within  weight range. If patient weight on day of infusion falls   outside of the 10% variance, or weight range, infusion is administered and   pharmacy contacted regarding future dosing adjustments, per policy.     1130 POCT urine hcg negative today.  1310  Patient tolerated infusion well.  30 minute observation complete.  VSS.  No s/s adverse reaction noted.  RTC 9/11/2024.      Initiated By: Kenia Arriaga RN

## 2024-08-14 DIAGNOSIS — E78.2 MIXED HYPERLIPIDEMIA: ICD-10-CM

## 2024-08-15 RX ORDER — ATORVASTATIN CALCIUM 10 MG/1
10 TABLET, FILM COATED ORAL DAILY
Qty: 90 TABLET | Refills: 1 | Status: SHIPPED | OUTPATIENT
Start: 2024-08-15

## 2024-08-20 ENCOUNTER — APPOINTMENT (OUTPATIENT)
Dept: RHEUMATOLOGY | Facility: CLINIC | Age: 39
End: 2024-08-20
Payer: COMMERCIAL

## 2024-08-20 VITALS
WEIGHT: 109 LBS | OXYGEN SATURATION: 98 % | DIASTOLIC BLOOD PRESSURE: 60 MMHG | BODY MASS INDEX: 21.97 KG/M2 | SYSTOLIC BLOOD PRESSURE: 102 MMHG | HEART RATE: 83 BPM | HEIGHT: 59 IN

## 2024-08-20 DIAGNOSIS — Z79.899 ON LONG TERM DRUG THERAPY: ICD-10-CM

## 2024-08-20 DIAGNOSIS — M05.79 RHEUMATOID ARTHRITIS INVOLVING MULTIPLE SITES WITH POSITIVE RHEUMATOID FACTOR (MULTI): ICD-10-CM

## 2024-08-20 DIAGNOSIS — E78.2 MIXED DYSLIPIDEMIA: ICD-10-CM

## 2024-08-20 DIAGNOSIS — D84.1 DISORDER OF COMPLEMENT (MULTI): Primary | ICD-10-CM

## 2024-08-20 PROCEDURE — 1036F TOBACCO NON-USER: CPT | Performed by: INTERNAL MEDICINE

## 2024-08-20 PROCEDURE — 3008F BODY MASS INDEX DOCD: CPT | Performed by: INTERNAL MEDICINE

## 2024-08-20 PROCEDURE — 99214 OFFICE O/P EST MOD 30 MIN: CPT | Performed by: INTERNAL MEDICINE

## 2024-08-20 ASSESSMENT — PATIENT HEALTH QUESTIONNAIRE - PHQ9
1. LITTLE INTEREST OR PLEASURE IN DOING THINGS: NOT AT ALL
2. FEELING DOWN, DEPRESSED OR HOPELESS: NOT AT ALL
SUM OF ALL RESPONSES TO PHQ9 QUESTIONS 1 AND 2: 0

## 2024-08-20 ASSESSMENT — ROUTINE ASSESSMENT OF PATIENT INDEX DATA (RAPID3)
SEVERITY_SCORE: 0
PARTIPATE_RECREATIONAL_ACTIVITIES: WITH SOME DIFFICULTY
FN_SCORE: 0.3
ON A SCALE OF ONE TO TEN, CONSIDERING ALL THE WAYS IN WHICH ILLNESS AND HEALTH CONDITIONS MAY AFFECT YOU AT THIS TIME, PLEASE INDICATE BELOW HOW YOU ARE DOING:: 0.5
WALK_KILOMETERS: WITHOUT ANY DIFFICULTY
FEELINGS_DEPRESSION: WITHOUT ANY DIFFICULTY
IN_OUT_BED: WITHOUT ANY DIFFICULTY
SUM OF QUESTIONS A TO J: 1
GOOD_NIGHTS_SLEEP: WITHOUT ANY DIFFICULTY
SEVERITY_SCORE: NEAR REMISSION (NR)
WASH_DRY_BODY: WITHOUT ANY DIFFICULTY
PICK_CLOTHES_OFF_FLOOR: WITHOUT ANY DIFFICULTY
TURN_FAUCETS_OFF: WITHOUT ANY DIFFICULTY
ON A SCALE OF ONE TO TEN, HOW MUCH PAIN HAVE YOU HAD BECAUSE OF YOUR CONDITION OVER THE PAST WEEK?: 0.5
WALK_FLAT_GROUND: WITHOUT ANY DIFFICULTY
FEELINGS_ANXIETY_NERVOUS: WITH SOME DIFFICULTY
TOTAL RAPID3 SCORE: 1.3
WEIGHTED_TOTAL_SCORE: 0.43
ON A SCALE OF ONE TO TEN, CONSIDERING ALL THE WAYS IN WHICH ILLNESS AND HEALTH CONDITIONS MAY AFFECT YOU AT THIS TIME, PLEASE INDICATE BELOW HOW YOU ARE DOING:: 0.5
IN_OUT_TRANSPORT: WITHOUT ANY DIFFICULTY
DRESS_YOURSELF: WITHOUT ANY DIFFICULTY
ON A SCALE OF ONE TO TEN, HOW MUCH PAIN HAVE YOU HAD BECAUSE OF YOUR CONDITION OVER THE PAST WEEK?: 0.5
LIFT_CUP_TO_MOUTH: WITHOUT ANY DIFFICULTY

## 2024-08-20 ASSESSMENT — ENCOUNTER SYMPTOMS
MUSCULOSKELETAL NEGATIVE: 1
OCCASIONAL FEELINGS OF UNSTEADINESS: 0
DEPRESSION: 0
EYES NEGATIVE: 1
LOSS OF SENSATION IN FEET: 0
NEUROLOGICAL NEGATIVE: 1
CONSTITUTIONAL NEGATIVE: 1
GASTROINTESTINAL NEGATIVE: 1
CARDIOVASCULAR NEGATIVE: 1
ENDOCRINE NEGATIVE: 1
HEMATOLOGIC/LYMPHATIC NEGATIVE: 1
RESPIRATORY NEGATIVE: 1

## 2024-08-20 ASSESSMENT — PAIN SCALES - GENERAL: PAINLEVEL: 0-NO PAIN

## 2024-08-20 ASSESSMENT — JOINT PAIN
TOTAL NUMBER OF SWOLLEN JOINTS: 0
TOTAL NUMBER OF TENDER JOINTS: 0

## 2024-08-20 NOTE — PROGRESS NOTES
"Chief Complaint:  This 38 y.o. female presents with the chief complaint of seropositive rheumatoid arthritis (RA).    Subjective:   HPI   Problem:  1: RA       The patient returns for ongoing follow-up of seropositive RA on long-term treatment with ACTEMRA (TOCILIZUMAB) intravenously.       The patient had her most recent treatment one week ago at the [facility] infusion center. The patient reports that the infusion process went well; she denies any adverse drug effects; and, she has not experienced any interval infections or illnesses requiring treatment since her previous visit on 6/18/2024.        The patient reports feeling generally well. She is raising her two children, participates in their daily activities, and the children have been well this summer. In particular, no family members have had COVID19 this summer.         The patient further reports that the RA remains entirely asymptomatic. She denies early AM joint stiffness or pain, new or evolving joint swelling or deformities, and no loss of joint mobility or new evolving joint deformities. There are no recurring or persistent subcutaneous rheumatoid nodules.           In summary, the patient is on long-term, single treatment with ACTEMRA IV, has not required a cDMARD, such as METHOTREXATE, and considers her RA to be inactive and controlled and non-progressive.     Review of Systems   Constitutional: Negative.    HENT: Negative.     Eyes: Negative.    Respiratory: Negative.     Cardiovascular: Negative.    Gastrointestinal: Negative.    Endocrine: Negative.    Genitourinary: Negative.    Musculoskeletal: Negative.    Neurological: Negative.    Hematological: Negative.      Objective:   /60   Pulse 83   Ht 1.499 m (4' 11\")   Wt 49.4 kg (109 lb)   SpO2 98%   BMI 22.02 kg/m²      Physical Exam  Vitals and nursing note reviewed.   Constitutional:       General: She is not in acute distress.     Appearance: Normal appearance. She is normal " weight. She is not ill-appearing, toxic-appearing or diaphoretic.   HENT:      Head: Normocephalic.   Eyes:      General:         Right eye: No discharge.         Left eye: No discharge.      Extraocular Movements: Extraocular movements intact.      Conjunctiva/sclera: Conjunctivae normal.      Pupils: Pupils are equal, round, and reactive to light.   Neck:      Vascular: No carotid bruit.   Cardiovascular:      Rate and Rhythm: Normal rate and regular rhythm.      Pulses: Normal pulses.      Heart sounds: Normal heart sounds. No murmur heard.     No gallop.   Pulmonary:      Effort: Pulmonary effort is normal. No respiratory distress.      Breath sounds: Normal breath sounds. No wheezing, rhonchi or rales.   Abdominal:      General: Abdomen is flat. Bowel sounds are normal. There is no distension.      Palpations: Abdomen is soft. There is no mass.      Tenderness: There is no abdominal tenderness. There is no guarding or rebound.      Hernia: No hernia is present.   Musculoskeletal:         General: No swelling, tenderness, deformity or signs of injury. Normal range of motion.      Cervical back: Normal range of motion and neck supple. No rigidity or tenderness.      Right lower leg: No edema.      Left lower leg: No edema.   Lymphadenopathy:      Cervical: No cervical adenopathy.   Neurological:      Mental Status: She is alert.        No active synovitis, no new or evolving deformities, no   subcutaneous rheumatoid nodules.     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   Mixed hyperlipidemia - E78.2  5.   Other current long-term treatment with ACTEMRA  -  Z79.899    Plan:    Procedure: Continue ACTEMRA. Because the patient has fared very well and remains in long-term RA remission on biologic treatment only, I recommend reducing the dose to 4 mg/kg = 197.6 mg every month IV.  The patient and I discussed this decision, and she approves this recommendation and is in agreement about how well she is doing on ACTERMA therapy. Fortunately, she has had no adverse drug effects, such as interval infections.  Laboratory tests from 7/2024 were detailed and discussed. There were some modest abnormalities, including a rise of AST and reduced bicarbonate levels, that may be attributed to a one-time increase in EtOH use socially. She was seen in an ED where she was treated with fluids, and recovered within a day. Laboratory testing will be repeated in October.    3.   Return to office in October 2024.       Diego Burgos MD

## 2024-09-10 ENCOUNTER — APPOINTMENT (OUTPATIENT)
Dept: INFUSION THERAPY | Facility: CLINIC | Age: 39
End: 2024-09-10
Payer: COMMERCIAL

## 2024-09-11 ENCOUNTER — APPOINTMENT (OUTPATIENT)
Dept: INFUSION THERAPY | Facility: CLINIC | Age: 39
End: 2024-09-11
Payer: COMMERCIAL

## 2024-09-11 VITALS
RESPIRATION RATE: 16 BRPM | BODY MASS INDEX: 22.2 KG/M2 | OXYGEN SATURATION: 99 % | DIASTOLIC BLOOD PRESSURE: 70 MMHG | SYSTOLIC BLOOD PRESSURE: 106 MMHG | TEMPERATURE: 98.6 F | HEART RATE: 90 BPM | WEIGHT: 109.9 LBS

## 2024-09-11 DIAGNOSIS — M05.79 SEROPOSITIVE RHEUMATOID ARTHRITIS OF MULTIPLE SITES (MULTI): ICD-10-CM

## 2024-09-11 LAB — PREGNANCY TEST URINE, POC: NEGATIVE

## 2024-09-11 PROCEDURE — 96365 THER/PROPH/DIAG IV INF INIT: CPT | Performed by: NURSE PRACTITIONER

## 2024-09-11 PROCEDURE — 96366 THER/PROPH/DIAG IV INF ADDON: CPT | Performed by: NURSE PRACTITIONER

## 2024-09-11 PROCEDURE — 81025 URINE PREGNANCY TEST: CPT | Performed by: NURSE PRACTITIONER

## 2024-09-11 RX ORDER — DIPHENHYDRAMINE HYDROCHLORIDE 50 MG/ML
50 INJECTION INTRAMUSCULAR; INTRAVENOUS AS NEEDED
OUTPATIENT
Start: 2024-10-08

## 2024-09-11 RX ORDER — EPINEPHRINE 0.3 MG/.3ML
0.3 INJECTION SUBCUTANEOUS EVERY 5 MIN PRN
OUTPATIENT
Start: 2024-10-08

## 2024-09-11 RX ORDER — FAMOTIDINE 10 MG/ML
20 INJECTION INTRAVENOUS ONCE AS NEEDED
OUTPATIENT
Start: 2024-10-08

## 2024-09-11 RX ORDER — ALBUTEROL SULFATE 0.83 MG/ML
3 SOLUTION RESPIRATORY (INHALATION) AS NEEDED
OUTPATIENT
Start: 2024-10-08

## 2024-09-11 ASSESSMENT — ENCOUNTER SYMPTOMS
MYALGIAS: 1
ARTHRALGIAS: 1

## 2024-09-11 ASSESSMENT — PAIN SCALES - GENERAL: PAINLEVEL: 0-NO PAIN

## 2024-09-11 NOTE — PATIENT INSTRUCTIONS
Today :We administered tocilizumab (Actemra) 200 mg in sodium chloride 0.9% 100 mL IV.     For:   1. Seropositive rheumatoid arthritis of multiple sites (Multi)         Your next appointment is due in:  10/9/2024        Please read the  Medication Guide that was given to you and reviewed during todays visit.     (Tell all doctors including dentists that you are taking this medication)     Go to the emergency room or call 911 if:  -You have signs of allergic reaction:   -Rash, hives, itching.   -Swollen, blistered, peeling skin.   -Swelling of face, lips, mouth, tongue or throat.   -Tightness of chest, trouble breathing, swallowing or talking     Call your doctor:  - If IV / injection site gets red, warm, swollen, itchy or leaks fluid or pus.     (Leave dressing on your IV site for at least 2 hours and keep area clean and dry  - If you get sick or have symptoms of infection or are not feeling well for any reason.    (Wash your hands often, stay away from people who are sick)  - If you have side effects from your medication that do not go away or are bothersome.     (Refer to the teaching your nurse gave you for side effects to call your doctor about)    - Common side effects may include:  stuffy nose, headache, feeling tired, muscle aches, upset stomach  - Before receiving any vaccines     - Call the [facility] at   If:  - You get sick, are on antibiotics, have had a recent vaccine, have surgery or dental work and your doctor wants your visit rescheduled.  - You need to cancel and reschedule your visit for any reason. Call at least 2 days before your visit if you need to cancel.   - Your insurance changes before your next visit.    (We will need to get approval from your new insurance. This can take up to two weeks.)     The [facility] is opened Monday thru Friday. We are closed on weekends and holidays.   Voice mail will take your call if the center is closed. If you leave a message  please allow 24 hours for a call back during weekdays. If you leave a message on a weekend/holiday, we will call you back the next business day.

## 2024-09-11 NOTE — PROGRESS NOTES
[facility]   Infusion Clinic Note   Date: 2024   Name: Bria Muniz  : 1985   MRN: [id number]          Reason for Visit: OP Infusion (Actemra)         Today: We administered tocilizumab (Actemra) 200 mg in sodium chloride 0.9% 100 mL IV.       Visit Type: INFUSION- Maintenance- Every 28 days        Ordered By: Dr. Burgos       Accompanied by:Self       Diagnosis: Seropositive rheumatoid arthritis of multiple sites (Multi)        Allergies:   Allergies as of 2024 - Reviewed 2024   Allergen Reaction Noted    Penicillins Hives 2023    Tramadol Dizziness 2023          Current Medications has a current medication list which includes the following prescription(s): acetaminophen, atorvastatin, fluocinonide, ibuprofen, mirena, and actemra.       Vitals:   Vitals:    24 1101 24 1240   BP: 112/73 106/70   Pulse: 96 90   Resp: 12 16   Temp: 37 °C (98.6 °F) 37 °C (98.6 °F)   TempSrc: Temporal    SpO2: 100% 99%   Weight: 49.9 kg (109 lb 14.4 oz)    PainSc: 0-No pain              Infusion Pre-procedure Checklist:   - Allergies reviewed: yes   - Medications reviewed: yes       - Previous reaction to current treatment: no      Assess patient for the concerns below. Document provider notification as appropriate.  - Active or recent infection with/without current antibiotic use: no  - Recent or planned invasive dental work: no  - Recent or planned surgeries: no  - Recently received or plans to receive vaccinations: no  - Has treatment related toxicities: no  - Is pregnant:  no, Urine HCG negative today      Pain: 0   - Is the pain different from normal: no   - Is your Doctor aware:  n/a       Labs:  Patient informed multiple labs ( fasting) ordered by Dr. Burgos- instructed patient to go to out patient lab prior to her next appointment with Dr. Burgos- verbal understanding nexpressed by patient.          Fall Risk Screening: Sakshi Fall Risk  History  of Falling, Immediate or Within 3 Months: No  Secondary Diagnosis: Yes  Ambulatory Aid: Walks without aid/bedrest/nurse assist  Intravenous Therapy/Heparin Lock: No  Gait/Transferring: Normal/bedrest/immobile  Mental Status: Oriented to own ability  Cantor Fall Risk Score: 15       Review Of Systems:  Review of Systems   Musculoskeletal:  Positive for arthralgias and myalgias.   All other systems reviewed and are negative.        ROS completed? Yes      Infusion Readiness:  - Assessment Concerns Related to Infusion: No  - Provider notified: no      Document Below Only If Indicated:   New Patient Education:    N/A (returning patient for continuation of therapy. Ongoing education provided as needed.)        Treatment Conditions & Drug Specific Questions:    Tocilizumab  (ACTEMRA)   (Unless otherwise specified on patient specific therapy plan):     TREATMENT CONDITIONS:  Unless otherwise specified on patient specific therapy plan HOLD and notify provider prior to proceeding with treatment if:   o Platelets LESS than 100 x 10E9L  o ANC LESS than 2 x 10E9/L  o ALT GREATER than 1.5x ULN and/or AST GREATER than 1.5x ULN  o Positive T-spot  o Reactive Hepatitis B Surface Antigen  -           Positive Pregnancy    Lab Results   Component Value Date     07/26/2024      Lab Results   Component Value Date    NEUTROABS 3.32 07/26/2024        Chemistry    Lab Results   Component Value Date/Time     07/26/2024 0001    K 3.4 07/26/2024 0001     07/26/2024 0001    CO2 18 (L) 07/26/2024 0001    BUN 8 07/26/2024 0001    CREATININE 0.60 07/26/2024 0001    Lab Results   Component Value Date/Time    CALCIUM 9.1 07/26/2024 0001    ALKPHOS 41 07/26/2024 0001    AST 48 (H) 07/26/2024 0001    ALT 36 07/26/2024 0001    BILITOT 0.2 07/26/2024 0001        ,  Lab Results   Component Value Date    ALT 36 07/26/2024    AST 48 (H) 07/26/2024    ALKPHOS 41 07/26/2024    BILITOT 0.2 07/26/2024      Lab Results   Component Value Date  "   TBSIN Negative 12/04/2023    TBGRES Negative  Reference range: NEGATIVE   02/07/2022    TSPOTR  05/17/2023     Negative  Reference range: Normal Value: Negative    A negative test result does not exclude the possibility of exposure  to   or infection with Mycobacterium tuberculosis (M. tuberculosis).    Patients with recent exposure to TB infected individuals exhibiting a   negative T-SPOT.TB result should be considered for retesting within 6   weeks or if other relevant clinical symptoms indicate.  Results from   T-SPOT.TB testing must be used in conjunction with each individual's   epidemiological history, current medical status, and results of other   diagnostic evaluations.  The T-SPOT.TB test is qualitative and  results   are reported as positive, borderline or negative, given that the test   controls perform as expected. In line with the Centers for Disease   Control and Prevention's 2010 recommendation to report quantitative   measurements alongside the qualitative result, the laboratory  provides   spot counts for informational purposes only.  The T-SPOT.TB test  should   not be interpreted as a quantitative test.  Test performed at:  [place] [address]      No results found for: \"NONUHFIRE\", \"NONUHSWGH\", \"NONUHFISH\", \"EXTHEPBSAG\"  Lab Results   Component Value Date    HEPBSAG Nonreactive 12/04/2023      Lab Results   Component Value Date    HEPAIGM Nonreactive 12/04/2023    HEPBCIGM Nonreactive 12/04/2023    HEPCAB Nonreactive 12/04/2023         Labs reviewed and patient meets treatment conditions? Yes    DRUG SPECIFIC QUESTIONS:  Any symptoms of (abdominal pain, distention, uncontrolled n/v) or new diagnosis of GI perforation? No  (If YES notify prescribing provider prior to proceeding. Actemra may increase risk of bowel perforation)      REMINDERS:  - PREGNANCY CATEGORY X DRUG. OBTAIN NEGTATIVE PREGNANCY TEST PRIOR TO FIRST INFUSION FOR    WOMEN OF CHILDBEARING ABILITY   - " WEIGHT BASED DRUG     Recommended Vitals/Observation:  Vitals: Monitor patient's vital signs prior to infusion start, every 30 minutes during infusion and 30 minutes after infusion.   Observation: Patient may leave 30 minutes post infusion.        Weight Based Drug Calculations:    WEIGHT BASED DRUGS: Tocilizumab (ACTEMRA)   Patient's dosing weight (kg): 50     10% weight variance for prescribed treatment: 45.0 kg to 55.0 kg     Patient's weight today:   Vitals:    09/11/24 1101   Weight: 49.9 kg (109 lb 14.4 oz)         weight range for prescribed dose:     Patient weight today falls outside of 10% variance or  weight range: No     Home Care pharmacist informed of weight variance: No    Doses that are weight based have an acceptable variance rule within 10% of the prescribed   order and/or within  weight range. If patient weight on day of infusion falls   outside of the 10% variance, or weight range, infusion is administered and   pharmacy contacted regarding future dosing adjustments, per policy.     1250    Patient tolerated infusion well.  Patient declining to stay for 30 minute post infusion observation.  No s/s adverse reaction noted.  VSS.  RTC on 10/9/2024.        Initiated By: Kenia Arriaga RN

## 2024-10-02 ENCOUNTER — LAB (OUTPATIENT)
Dept: LAB | Facility: LAB | Age: 39
End: 2024-10-02
Payer: COMMERCIAL

## 2024-10-02 DIAGNOSIS — M05.79 SEROPOSITIVE RHEUMATOID ARTHRITIS OF MULTIPLE SITES (MULTI): ICD-10-CM

## 2024-10-02 DIAGNOSIS — Z79.899 ON LONG TERM DRUG THERAPY: ICD-10-CM

## 2024-10-02 DIAGNOSIS — M05.79 RHEUMATOID ARTHRITIS INVOLVING MULTIPLE SITES WITH POSITIVE RHEUMATOID FACTOR (MULTI): ICD-10-CM

## 2024-10-02 LAB
ALBUMIN SERPL BCP-MCNC: 4.7 G/DL (ref 3.4–5)
ALP SERPL-CCNC: 33 U/L (ref 33–110)
ALT SERPL W P-5'-P-CCNC: 22 U/L (ref 7–45)
ANION GAP SERPL CALCULATED.3IONS-SCNC: 10 MMOL/L (ref 10–20)
AST SERPL W P-5'-P-CCNC: 28 U/L (ref 9–39)
BILIRUB SERPL-MCNC: 0.8 MG/DL (ref 0–1.2)
BUN SERPL-MCNC: 10 MG/DL (ref 6–23)
CALCIUM SERPL-MCNC: 9.6 MG/DL (ref 8.6–10.3)
CHLORIDE SERPL-SCNC: 104 MMOL/L (ref 98–107)
CO2 SERPL-SCNC: 25 MMOL/L (ref 21–32)
CREAT SERPL-MCNC: 0.61 MG/DL (ref 0.5–1.05)
CRP SERPL-MCNC: <0.1 MG/DL
EGFRCR SERPLBLD CKD-EPI 2021: >90 ML/MIN/1.73M*2
ERYTHROCYTE [SEDIMENTATION RATE] IN BLOOD BY WESTERGREN METHOD: 5 MM/H (ref 0–20)
GLUCOSE SERPL-MCNC: 94 MG/DL (ref 74–99)
HBV SURFACE AG SERPL QL IA: NONREACTIVE
HCG UR QL IA.RAPID: NEGATIVE
HCV AB SER QL: NONREACTIVE
POTASSIUM SERPL-SCNC: 4.4 MMOL/L (ref 3.5–5.3)
PROT SERPL-MCNC: 7.1 G/DL (ref 6.4–8.2)
SODIUM SERPL-SCNC: 135 MMOL/L (ref 136–145)

## 2024-10-02 PROCEDURE — 81025 URINE PREGNANCY TEST: CPT

## 2024-10-02 PROCEDURE — 86140 C-REACTIVE PROTEIN: CPT

## 2024-10-02 PROCEDURE — 85652 RBC SED RATE AUTOMATED: CPT

## 2024-10-02 PROCEDURE — 87340 HEPATITIS B SURFACE AG IA: CPT

## 2024-10-02 PROCEDURE — 83529 ASAY OF INTERLEUKIN-6 (IL-6): CPT

## 2024-10-02 PROCEDURE — 86481 TB AG RESPONSE T-CELL SUSP: CPT

## 2024-10-02 PROCEDURE — 36415 COLL VENOUS BLD VENIPUNCTURE: CPT

## 2024-10-02 PROCEDURE — 80053 COMPREHEN METABOLIC PANEL: CPT

## 2024-10-02 PROCEDURE — 86803 HEPATITIS C AB TEST: CPT

## 2024-10-04 LAB
NIL(NEG) CONTROL SPOT COUNT: NORMAL
PANEL A SPOT COUNT: 0
PANEL B SPOT COUNT: 0
POS CONTROL SPOT COUNT: NORMAL
T-SPOT. TB INTERPRETATION: NEGATIVE

## 2024-10-05 LAB — IL6 SERPL-MCNC: 2.2 PG/ML

## 2024-10-08 ENCOUNTER — APPOINTMENT (OUTPATIENT)
Dept: RHEUMATOLOGY | Facility: CLINIC | Age: 39
End: 2024-10-08
Payer: COMMERCIAL

## 2024-10-08 VITALS
SYSTOLIC BLOOD PRESSURE: 117 MMHG | HEART RATE: 66 BPM | BODY MASS INDEX: 22.18 KG/M2 | OXYGEN SATURATION: 100 % | HEIGHT: 59 IN | WEIGHT: 110 LBS | DIASTOLIC BLOOD PRESSURE: 83 MMHG

## 2024-10-08 DIAGNOSIS — M05.79 RHEUMATOID ARTHRITIS INVOLVING MULTIPLE SITES WITH POSITIVE RHEUMATOID FACTOR (MULTI): Primary | ICD-10-CM

## 2024-10-08 DIAGNOSIS — E78.2 MIXED HYPERLIPIDEMIA: ICD-10-CM

## 2024-10-08 DIAGNOSIS — D84.1 DEFECTS IN THE COMPLEMENT SYSTEM (MULTI): ICD-10-CM

## 2024-10-08 DIAGNOSIS — Z79.899 ON LONG TERM DRUG THERAPY: ICD-10-CM

## 2024-10-08 PROCEDURE — 99213 OFFICE O/P EST LOW 20 MIN: CPT | Performed by: INTERNAL MEDICINE

## 2024-10-08 PROCEDURE — 3008F BODY MASS INDEX DOCD: CPT | Performed by: INTERNAL MEDICINE

## 2024-10-08 ASSESSMENT — ROUTINE ASSESSMENT OF PATIENT INDEX DATA (RAPID3)
PARTIPATE_RECREATIONAL_ACTIVITIES: WITH SOME DIFFICULTY
ON A SCALE OF ONE TO TEN, CONSIDERING ALL THE WAYS IN WHICH ILLNESS AND HEALTH CONDITIONS MAY AFFECT YOU AT THIS TIME, PLEASE INDICATE BELOW HOW YOU ARE DOING:: 1
IN_OUT_TRANSPORT: WITHOUT ANY DIFFICULTY
WALK_KILOMETERS: WITHOUT ANY DIFFICULTY
ON A SCALE OF ONE TO TEN, HOW MUCH PAIN HAVE YOU HAD BECAUSE OF YOUR CONDITION OVER THE PAST WEEK?: 0.5
FEELINGS_ANXIETY_NERVOUS: WITH SOME DIFFICULTY
WEIGHTED_TOTAL_SCORE: 0.6
IN_OUT_BED: WITHOUT ANY DIFFICULTY
FEELINGS_DEPRESSION: WITHOUT ANY DIFFICULTY
ON A SCALE OF ONE TO TEN, CONSIDERING ALL THE WAYS IN WHICH ILLNESS AND HEALTH CONDITIONS MAY AFFECT YOU AT THIS TIME, PLEASE INDICATE BELOW HOW YOU ARE DOING:: 1
FN_SCORE: 0.3
SEVERITY_SCORE: NEAR REMISSION (NR)
TURN_FAUCETS_OFF: WITHOUT ANY DIFFICULTY
ON A SCALE OF ONE TO TEN, HOW MUCH PAIN HAVE YOU HAD BECAUSE OF YOUR CONDITION OVER THE PAST WEEK?: 0.5
SUM OF QUESTIONS A TO J: 1
SEVERITY_SCORE: 0
DRESS_YOURSELF: WITHOUT ANY DIFFICULTY
WASH_DRY_BODY: WITHOUT ANY DIFFICULTY
PICK_CLOTHES_OFF_FLOOR: WITHOUT ANY DIFFICULTY
LIFT_CUP_TO_MOUTH: WITHOUT ANY DIFFICULTY
TOTAL RAPID3 SCORE: 1.8
WALK_FLAT_GROUND: WITHOUT ANY DIFFICULTY
GOOD_NIGHTS_SLEEP: WITHOUT ANY DIFFICULTY

## 2024-10-08 ASSESSMENT — ENCOUNTER SYMPTOMS
DEPRESSION: 0
OCCASIONAL FEELINGS OF UNSTEADINESS: 0
LOSS OF SENSATION IN FEET: 0

## 2024-10-08 ASSESSMENT — JOINT PAIN
TOTAL NUMBER OF TENDER JOINTS: 0
TOTAL NUMBER OF SWOLLEN JOINTS: 0

## 2024-10-08 ASSESSMENT — PAIN SCALES - GENERAL: PAINLEVEL: 0-NO PAIN

## 2024-10-08 NOTE — PROGRESS NOTES
"Chief Complaint:  This 38 y.o. femalepresents with the chief complaint of rheumatoid arthritis.      Subjective:   HPI   Problem:  1: RA       The patient returns today for routine follow-up of seropositive RA on treatment with long-term ACTEMRA (TOCILIZUMAB) intravenously.       The patient has fared exceptionally well. Her RA remains asymptomatic and well controlled.       Today, the patient offers no concerns, new problems or complaints.    Review of Systems   All other systems reviewed and are negative.    Objective:   /83   Pulse 66   Ht 1.499 m (4' 11\")   Wt 49.9 kg (110 lb)   SpO2 100%   BMI 22.22 kg/m²      Physical Exam  Vitals and nursing note reviewed.   Constitutional:       General: She is not in acute distress.     Appearance: Normal appearance. She is normal weight. She is not ill-appearing, toxic-appearing or diaphoretic.   HENT:      Head: Normocephalic.   Eyes:      General:         Right eye: No discharge.         Left eye: No discharge.      Extraocular Movements: Extraocular movements intact.      Conjunctiva/sclera: Conjunctivae normal.      Pupils: Pupils are equal, round, and reactive to light.   Neck:      Vascular: No carotid bruit.   Cardiovascular:      Rate and Rhythm: Normal rate and regular rhythm.      Pulses: Normal pulses.      Heart sounds: Normal heart sounds. No murmur heard.     No gallop.   Pulmonary:      Effort: Pulmonary effort is normal. No respiratory distress.      Breath sounds: Normal breath sounds. No wheezing, rhonchi or rales.   Abdominal:      General: Abdomen is flat. Bowel sounds are normal. There is no distension.      Palpations: Abdomen is soft. There is no mass.      Tenderness: There is no abdominal tenderness. There is no guarding or rebound.      Hernia: No hernia is present.   Musculoskeletal:         General: No swelling, tenderness, deformity or signs of injury. Normal range of motion.      Cervical back: Normal range of motion and neck " supple. No rigidity or tenderness.      Right lower leg: No edema.      Left lower leg: No edema.   Lymphadenopathy:      Cervical: No cervical adenopathy.   Neurological:      Mental Status: She is alert.           No active synovitis.  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  The rationale, objectives, risk/reward and potential side effects have been explained to the patient and the patient understands these facts.  The patient further understands that periodic laboratory studies must be performed to monitor this drug and the patient will have these studies performed when the requisition is provided.  The patient is again educated to report to the office adverse effects including allergic reactions or infections.  The patient agreed to this plan.   2.   Laboratory tests results were discussed with the patient. All results remain        normal from this most recent testing.   3.   Follow-up rheumatology for ongoing treatment.       Diego Burgos MD

## 2024-10-09 ENCOUNTER — APPOINTMENT (OUTPATIENT)
Dept: INFUSION THERAPY | Facility: CLINIC | Age: 39
End: 2024-10-09
Payer: COMMERCIAL

## 2024-10-09 VITALS
RESPIRATION RATE: 18 BRPM | TEMPERATURE: 98.4 F | OXYGEN SATURATION: 100 % | WEIGHT: 110.9 LBS | HEART RATE: 80 BPM | DIASTOLIC BLOOD PRESSURE: 76 MMHG | SYSTOLIC BLOOD PRESSURE: 108 MMHG | BODY MASS INDEX: 22.4 KG/M2

## 2024-10-09 DIAGNOSIS — M05.79 SEROPOSITIVE RHEUMATOID ARTHRITIS OF MULTIPLE SITES (MULTI): ICD-10-CM

## 2024-10-09 LAB — PREGNANCY TEST URINE, POC: NEGATIVE

## 2024-10-09 PROCEDURE — 96365 THER/PROPH/DIAG IV INF INIT: CPT | Performed by: NURSE PRACTITIONER

## 2024-10-09 PROCEDURE — 81025 URINE PREGNANCY TEST: CPT | Performed by: NURSE PRACTITIONER

## 2024-10-09 RX ORDER — ALBUTEROL SULFATE 0.83 MG/ML
3 SOLUTION RESPIRATORY (INHALATION) AS NEEDED
OUTPATIENT
Start: 2024-11-06

## 2024-10-09 RX ORDER — FAMOTIDINE 10 MG/ML
20 INJECTION INTRAVENOUS ONCE AS NEEDED
OUTPATIENT
Start: 2024-11-06

## 2024-10-09 RX ORDER — EPINEPHRINE 0.3 MG/.3ML
0.3 INJECTION SUBCUTANEOUS EVERY 5 MIN PRN
OUTPATIENT
Start: 2024-11-06

## 2024-10-09 RX ORDER — DIPHENHYDRAMINE HYDROCHLORIDE 50 MG/ML
50 INJECTION INTRAMUSCULAR; INTRAVENOUS AS NEEDED
OUTPATIENT
Start: 2024-11-06

## 2024-10-09 ASSESSMENT — PAIN SCALES - GENERAL: PAINLEVEL: 0-NO PAIN

## 2024-10-09 NOTE — PROGRESS NOTES
[facility]   Infusion Clinic Note   Date: 2024   Name: Bria Muniz  : 1985   MRN: [id number]          Reason for Visit: OP Infusion (Actemra)         Today: We administered tocilizumab (Actemra) 200 mg in sodium chloride 0.9% 100 mL IV.       Visit Type: INFUSION       Ordered By: Dr. Burgos       Accompanied by:Self       Diagnosis: Seropositive rheumatoid arthritis of multiple sites (Multi)        Allergies:   Allergies as of 10/09/2024 - Reviewed 10/09/2024   Allergen Reaction Noted    Penicillins Hives 2023    Tramadol Dizziness 2023          Current Medications has a current medication list which includes the following prescription(s): acetaminophen, atorvastatin, fluocinonide, ibuprofen, mirena, and actemra.       Vitals:   Vitals:    10/09/24 1108 10/09/24 1205 10/09/24 1259   BP: 124/79 115/79 108/76   Pulse: 99 86 80   Resp: 18 18 18   Temp: 37 °C (98.6 °F) 37.1 °C (98.8 °F) 36.9 °C (98.4 °F)   TempSrc: Temporal     SpO2: 100%  100%   Weight: 50.3 kg (110 lb 14.4 oz)     PainSc: 0-No pain               Infusion Pre-procedure Checklist:   - Allergies reviewed: yes   - Medications reviewed: yes       - Previous reaction to current treatment: no      Assess patient for the concerns below. Document provider notification as appropriate.  - Active or recent infection with/without current antibiotic use: no  - Recent or planned invasive dental work: no  - Recent or planned surgeries: no  - Recently received or plans to receive vaccinations: no  - Has treatment related toxicities: no  - Is pregnant:  no. Urine POCT pregnancy test negative today 10/9/24      Pain: 0   - Is the pain different from normal: no   - Is your Doctor aware:  n/a       Labs:  reviewed          Fall Risk Screening: Sakshi Fall Risk  History of Falling, Immediate or Within 3 Months: No  Secondary Diagnosis: Yes  Ambulatory Aid: Walks without aid/bedrest/nurse assist  Intravenous  Therapy/Heparin Lock: Yes  Gait/Transferring: Normal/bedrest/immobile  Mental Status: Oriented to own ability  Cantor Fall Risk Score: 35       Review Of Systems:  Review of Systems   Skin:         Patient has ezxcema   All other systems reviewed and are negative.        ROS completed? Yes      Infusion Readiness:  - Assessment Concerns Related to Infusion: No  - Provider notified: n/a      Document Below Only If Indicated:   New Patient Education:    N/A (returning patient for continuation of therapy. Ongoing education provided as needed.)        Treatment Conditions & Drug Specific Questions:    Tocilizumab  (ACTEMRA)   (Unless otherwise specified on patient specific therapy plan):     TREATMENT CONDITIONS:  Unless otherwise specified on patient specific therapy plan HOLD and notify provider prior to proceeding with treatment if:   o Platelets LESS than 100 x 10E9L  o ANC LESS than 2 x 10E9/L  o ALT GREATER than 1.5x ULN and/or AST GREATER than 1.5x ULN  o Positive T-spot  o Reactive Hepatitis B Surface Antigen  -           Positive Pregnancy    Lab Results   Component Value Date     07/26/2024      Lab Results   Component Value Date    NEUTROABS 3.32 07/26/2024        Chemistry    Lab Results   Component Value Date/Time     (L) 10/02/2024 1128    K 4.4 10/02/2024 1128     10/02/2024 1128    CO2 25 10/02/2024 1128    BUN 10 10/02/2024 1128    CREATININE 0.61 10/02/2024 1128    Lab Results   Component Value Date/Time    CALCIUM 9.6 10/02/2024 1128    ALKPHOS 33 10/02/2024 1128    AST 28 10/02/2024 1128    ALT 22 10/02/2024 1128    BILITOT 0.8 10/02/2024 1128        ,  Lab Results   Component Value Date    ALT 22 10/02/2024    AST 28 10/02/2024    ALKPHOS 33 10/02/2024    BILITOT 0.8 10/02/2024      Lab Results   Component Value Date    TBSIN Negative 10/02/2024    TBGRES Negative  Reference range: NEGATIVE   02/07/2022    TSPOTR  05/17/2023     Negative  Reference range: Normal Value:  "Negative    A negative test result does not exclude the possibility of exposure  to   or infection with Mycobacterium tuberculosis (M. tuberculosis).    Patients with recent exposure to TB infected individuals exhibiting a   negative T-SPOT.TB result should be considered for retesting within 6   weeks or if other relevant clinical symptoms indicate.  Results from   T-SPOT.TB testing must be used in conjunction with each individual's   epidemiological history, current medical status, and results of other   diagnostic evaluations.  The T-SPOT.TB test is qualitative and  results   are reported as positive, borderline or negative, given that the test   controls perform as expected. In line with the Centers for Disease   Control and Prevention's 2010 recommendation to report quantitative   measurements alongside the qualitative result, the laboratory  provides   spot counts for informational purposes only.  The T-SPOT.TB test  should   not be interpreted as a quantitative test.  Test performed at:  [address] [address]      No results found for: \"NONUHFIRE\", \"NONUHSWGH\", \"NONUHFISH\", \"EXTHEPBSAG\"  Lab Results   Component Value Date    HEPBSAG Nonreactive 10/02/2024      Lab Results   Component Value Date    HEPAIGM Nonreactive 12/04/2023    HEPBCIGM Nonreactive 12/04/2023    HEPCAB Nonreactive 10/02/2024         Labs reviewed and patient meets treatment conditions? Yes    DRUG SPECIFIC QUESTIONS:  Any symptoms of (abdominal pain, distention, uncontrolled n/v) or new diagnosis of GI perforation? No  (If YES notify prescribing provider prior to proceeding. Actemra may increase risk of bowel perforation)      REMINDERS:  - PREGNANCY CATEGORY X DRUG. OBTAIN NEGTATIVE PREGNANCY TEST PRIOR TO FIRST INFUSION FOR    WOMEN OF CHILDBEARING ABILITY   - WEIGHT BASED DRUG     Recommended Vitals/Observation:  Vitals: Monitor patient's vital signs prior to infusion start, every 30 minutes during infusion and 30 " minutes after infusion.   Observation: Patient may leave 30 minutes post infusion.        Weight Based Drug Calculations:    WEIGHT BASED DRUGS: Tocilizumab (ACTEMRA)   Patient's dosing weight (kg): 50     10% weight variance for prescribed treatment: 45 kg to 55 kg     Patient's weight today:   Vitals:    10/09/24 1108   Weight: 50.3 kg (110 lb 14.4 oz)         weight range for prescribed dose:     Patient weight today falls outside of 10% variance or  weight range: No     Home Care pharmacist informed of weight variance: Not applicable    Doses that are weight based have an acceptable variance rule within 10% of the prescribed   order and/or within  weight range. If patient weight on day of infusion falls   outside of the 10% variance, or weight range, infusion is administered and   pharmacy contacted regarding future dosing adjustments, per policy.         Initiated By: Robert Grover RN       1243 Line flushed with NS 30 ml when infusion bag completed.  1300 patient refuses to wait for 30 minute observation after flush completed. VSS. No signs of reaction noted.

## 2024-10-09 NOTE — PATIENT INSTRUCTIONS
Today :We administered tocilizumab (Actemra) 200 mg in sodium chloride 0.9% 100 mL IV.     For:   1. Seropositive rheumatoid arthritis of multiple sites (Multi)         Your next appointment is due in:  28 days        Please read the  Medication Guide that was given to you and reviewed during todays visit.     (Tell all doctors including dentists that you are taking this medication)     Go to the emergency room or call 911 if:  -You have signs of allergic reaction:   -Rash, hives, itching.   -Swollen, blistered, peeling skin.   -Swelling of face, lips, mouth, tongue or throat.   -Tightness of chest, trouble breathing, swallowing or talking     Call your doctor:  - If IV / injection site gets red, warm, swollen, itchy or leaks fluid or pus.     (Leave dressing on your IV site for at least 2 hours and keep area clean and dry  - If you get sick or have symptoms of infection or are not feeling well for any reason.    (Wash your hands often, stay away from people who are sick)  - If you have side effects from your medication that do not go away or are bothersome.     (Refer to the teaching your nurse gave you for side effects to call your doctor about)    - Common side effects may include:  stuffy nose, headache, feeling tired, muscle aches, upset stomach  - Before receiving any vaccines     - Call the [facility] at   If:  - You get sick, are on antibiotics, have had a recent vaccine, have surgery or dental work and your doctor wants your visit rescheduled.  - You need to cancel and reschedule your visit for any reason. Call at least 2 days before your visit if you need to cancel.   - Your insurance changes before your next visit.    (We will need to get approval from your new insurance. This can take up to two weeks.)     The [facility] is opened Monday thru Friday. We are closed on weekends and holidays.   Voice mail will take your call if the center is closed. If you leave a message  please allow 24 hours for a call back during weekdays. If you leave a message on a weekend/holiday, we will call you back the next business day.

## 2024-10-31 ENCOUNTER — TRANSCRIBE ORDERS (OUTPATIENT)
Dept: INFUSION THERAPY | Facility: CLINIC | Age: 39
End: 2024-10-31
Payer: COMMERCIAL

## 2024-10-31 DIAGNOSIS — M05.79 SEROPOSITIVE RHEUMATOID ARTHRITIS OF MULTIPLE SITES (MULTI): Primary | ICD-10-CM

## 2024-11-06 ENCOUNTER — APPOINTMENT (OUTPATIENT)
Dept: INFUSION THERAPY | Facility: CLINIC | Age: 39
End: 2024-11-06
Payer: COMMERCIAL

## 2024-11-07 ENCOUNTER — INFUSION (OUTPATIENT)
Dept: INFUSION THERAPY | Facility: CLINIC | Age: 39
End: 2024-11-07
Payer: COMMERCIAL

## 2024-11-07 VITALS
BODY MASS INDEX: 22.42 KG/M2 | OXYGEN SATURATION: 100 % | HEART RATE: 79 BPM | TEMPERATURE: 98.1 F | RESPIRATION RATE: 17 BRPM | SYSTOLIC BLOOD PRESSURE: 124 MMHG | DIASTOLIC BLOOD PRESSURE: 77 MMHG | WEIGHT: 111 LBS

## 2024-11-07 DIAGNOSIS — M05.79 SEROPOSITIVE RHEUMATOID ARTHRITIS OF MULTIPLE SITES (MULTI): ICD-10-CM

## 2024-11-07 PROCEDURE — 96365 THER/PROPH/DIAG IV INF INIT: CPT | Performed by: NURSE PRACTITIONER

## 2024-11-07 RX ORDER — ALBUTEROL SULFATE 0.83 MG/ML
3 SOLUTION RESPIRATORY (INHALATION) AS NEEDED
OUTPATIENT
Start: 2024-12-04

## 2024-11-07 RX ORDER — EPINEPHRINE 0.3 MG/.3ML
0.3 INJECTION SUBCUTANEOUS EVERY 5 MIN PRN
OUTPATIENT
Start: 2024-12-04

## 2024-11-07 RX ORDER — FAMOTIDINE 10 MG/ML
20 INJECTION INTRAVENOUS ONCE AS NEEDED
OUTPATIENT
Start: 2024-12-04

## 2024-11-07 RX ORDER — DIPHENHYDRAMINE HYDROCHLORIDE 50 MG/ML
50 INJECTION INTRAMUSCULAR; INTRAVENOUS AS NEEDED
OUTPATIENT
Start: 2024-12-04

## 2024-11-07 ASSESSMENT — ENCOUNTER SYMPTOMS
FATIGUE: 0
DIZZINESS: 0
FEVER: 0
HEADACHES: 1

## 2024-11-07 NOTE — PATIENT INSTRUCTIONS
Today :We administered tocilizumab (Actemra) 200 mg in sodium chloride 0.9% 100 mL IV.     For:   1. Seropositive rheumatoid arthritis of multiple sites (Multi)         Your next appointment is due in:  12/5/24        Please read the  Medication Guide that was given to you and reviewed during todays visit.     (Tell all doctors including dentists that you are taking this medication)     Go to the emergency room or call 911 if:  -You have signs of allergic reaction:   -Rash, hives, itching.   -Swollen, blistered, peeling skin.   -Swelling of face, lips, mouth, tongue or throat.   -Tightness of chest, trouble breathing, swallowing or talking     Call your doctor:  - If IV / injection site gets red, warm, swollen, itchy or leaks fluid or pus.     (Leave dressing on your IV site for at least 2 hours and keep area clean and dry  - If you get sick or have symptoms of infection or are not feeling well for any reason.    (Wash your hands often, stay away from people who are sick)  - If you have side effects from your medication that do not go away or are bothersome.     (Refer to the teaching your nurse gave you for side effects to call your doctor about)    - Common side effects may include:  stuffy nose, headache, feeling tired, muscle aches, upset stomach  - Before receiving any vaccines     - Call the [facility] at   If:  - You get sick, are on antibiotics, have had a recent vaccine, have surgery or dental work and your doctor wants your visit rescheduled.  - You need to cancel and reschedule your visit for any reason. Call at least 2 days before your visit if you need to cancel.   - Your insurance changes before your next visit.    (We will need to get approval from your new insurance. This can take up to two weeks.)     [facility] is opened Monday thru Friday. We are closed on weekends and holidays.   Voice mail will take your call if the center is closed. If you leave a message  please allow 24 hours for a call back during weekdays. If you leave a message on a weekend/holiday, we will call you back the next business day.    A pharmacist is available Monday - Friday from 8:30AM to 3:30PM to help answer any questions you may have about your prescriptions(s). Please call pharmacy at:    [facility]: [phone]  [facility]: [phone]  [facility]: [phone]

## 2024-11-07 NOTE — PROGRESS NOTES
[facility]   Infusion Clinic Note   Date: 2024   Name: Bria Muniz  : 1985   MRN: [id number]          Reason for Visit: OP Infusion (Actemra)         Today: We administered tocilizumab (Actemra) 200 mg in sodium chloride 0.9% 100 mL IV.       Visit Type: INFUSION       Ordered By: Saul       Accompanied by:Self       Diagnosis: Seropositive rheumatoid arthritis of multiple sites (Multi)        Allergies:   Allergies as of 2024 - Reviewed 2024   Allergen Reaction Noted    Penicillins Hives 2023    Tramadol Dizziness 2023          Current Medications has a current medication list which includes the following prescription(s): acetaminophen, atorvastatin, fluocinonide, ibuprofen, mirena, and actemra.       Vitals:   Vitals:    24 1311 24 1400   BP: 114/79 124/77   Pulse: 80 79   Resp: 18 17   Temp: 36.5 °C (97.7 °F) 36.7 °C (98.1 °F)   SpO2: 100% 100%   Weight: 50.3 kg (111 lb)              Infusion Pre-procedure Checklist:   - Allergies reviewed: yes   - Medications reviewed: yes       - Previous reaction to current treatment: no      Assess patient for the concerns below. Document provider notification as appropriate.  - Active or recent infection with/without current antibiotic use: no  - Recent or planned invasive dental work: no  - Recent or planned surgeries: no  - Recently received or plans to receive vaccinations: no  - Has treatment related toxicities: no  - Is pregnant:  no      Pain: 0   - Is the pain different from normal: no   - Is your Doctor aware:  no       Labs: N/A          Fall Risk Screening: Cantor Fall Risk  History of Falling, Immediate or Within 3 Months: No  Secondary Diagnosis: No  Ambulatory Aid: Walks without aid/bedrest/nurse assist  Intravenous Therapy/Heparin Lock: Yes  Gait/Transferring: Normal/bedrest/immobile  Mental Status: Oriented to own ability  Cantor Fall Risk Score: 20       Review Of  Systems:  Review of Systems   Constitutional:  Negative for fatigue and fever.   Neurological:  Positive for headaches. Negative for dizziness.         ROS completed? Yes      Infusion Readiness:  - Assessment Concerns Related to Infusion: No  - Provider notified: no      Document Below Only If Indicated:   New Patient Education:    N/A (returning patient for continuation of therapy. Ongoing education provided as needed.)        Treatment Conditions & Drug Specific Questions:    Tocilizumab  (ACTEMRA, TYENNE, TOFIDENCE)   (Unless otherwise specified on patient specific therapy plan):     TREATMENT CONDITIONS:  Unless otherwise specified on patient specific therapy plan HOLD and notify provider prior to proceeding with treatment if:   o Platelets LESS than 100 x 10E9L  o ANC LESS than 2 x 10E9/L  o ALT GREATER than 1.5x ULN and/or AST GREATER than 1.5x ULN  o Positive T-spot  o Reactive Hepatitis B Surface Antigen  -           Positive Pregnancy    Lab Results   Component Value Date     07/26/2024      Lab Results   Component Value Date    NEUTROABS 3.32 07/26/2024        Chemistry    Lab Results   Component Value Date/Time     (L) 10/02/2024 1128    K 4.4 10/02/2024 1128     10/02/2024 1128    CO2 25 10/02/2024 1128    BUN 10 10/02/2024 1128    CREATININE 0.61 10/02/2024 1128    Lab Results   Component Value Date/Time    CALCIUM 9.6 10/02/2024 1128    ALKPHOS 33 10/02/2024 1128    AST 28 10/02/2024 1128    ALT 22 10/02/2024 1128    BILITOT 0.8 10/02/2024 1128        ,  Lab Results   Component Value Date    ALT 22 10/02/2024    AST 28 10/02/2024    ALKPHOS 33 10/02/2024    BILITOT 0.8 10/02/2024      Lab Results   Component Value Date    TBSIN Negative 10/02/2024    TBGRES Negative  Reference range: NEGATIVE   02/07/2022    TSPOTR  05/17/2023     Negative  Reference range: Normal Value: Negative    A negative test result does not exclude the possibility of exposure  to   or infection with  "Mycobacterium tuberculosis (M. tuberculosis).    Patients with recent exposure to TB infected individuals exhibiting a   negative T-SPOT.TB result should be considered for retesting within 6   weeks or if other relevant clinical symptoms indicate.  Results from   T-SPOT.TB testing must be used in conjunction with each individual's   epidemiological history, current medical status, and results of other   diagnostic evaluations.  The T-SPOT.TB test is qualitative and  results   are reported as positive, borderline or negative, given that the test   controls perform as expected. In line with the Centers for Disease   Control and Prevention's 2010 recommendation to report quantitative   measurements alongside the qualitative result, the laboratory  provides   spot counts for informational purposes only.  The T-SPOT.TB test  should   not be interpreted as a quantitative test.  Test performed at:  [place] [id number]      No results found for: \"NONUHFIRE\", \"NONUHSWGH\", \"NONUHFISH\", \"EXTHEPBSAG\"  Lab Results   Component Value Date    HEPBSAG Nonreactive 10/02/2024      Lab Results   Component Value Date    HEPAIGM Nonreactive 12/04/2023    HEPBCIGM Nonreactive 12/04/2023    HEPCAB Nonreactive 10/02/2024         Labs reviewed and patient meets treatment conditions? Yes    DRUG SPECIFIC QUESTIONS:  Any symptoms of (abdominal pain, distention, uncontrolled n/v) or new diagnosis of GI perforation? No  (If YES notify prescribing provider prior to proceeding. Actemra may increase risk of bowel perforation)      REMINDERS:  - PREGNANCY CATEGORY X DRUG. OBTAIN NEGTATIVE PREGNANCY TEST PRIOR TO FIRST INFUSION FOR    WOMEN OF CHILDBEARING ABILITY   - WEIGHT BASED DRUG     Recommended Vitals/Observation:  Vitals: Monitor patient's vital signs prior to infusion start, every 30 minutes during infusion and 30 minutes after infusion.   Observation: Patient may leave 30 minutes post infusion.        Weight Based " Drug Calculations:          Initiated By: Jany La RN   Computer wants labs but pt just got labs done in October. Pt refused to have them done again.   Pt tolerated infusion well. Stayed for a partial flush but needed to leave and refused the full 30 min.  No signs and symptoms of reaction .

## 2024-11-25 ENCOUNTER — APPOINTMENT (OUTPATIENT)
Dept: RHEUMATOLOGY | Facility: CLINIC | Age: 39
End: 2024-11-25
Payer: COMMERCIAL

## 2024-12-02 ENCOUNTER — APPOINTMENT (OUTPATIENT)
Dept: RHEUMATOLOGY | Facility: CLINIC | Age: 39
End: 2024-12-02
Payer: COMMERCIAL

## 2024-12-09 ENCOUNTER — OFFICE VISIT (OUTPATIENT)
Dept: RHEUMATOLOGY | Facility: CLINIC | Age: 39
End: 2024-12-09
Payer: COMMERCIAL

## 2024-12-09 VITALS
DIASTOLIC BLOOD PRESSURE: 73 MMHG | WEIGHT: 110 LBS | OXYGEN SATURATION: 100 % | BODY MASS INDEX: 22.22 KG/M2 | HEART RATE: 87 BPM | SYSTOLIC BLOOD PRESSURE: 109 MMHG

## 2024-12-09 DIAGNOSIS — M05.9 SEROPOSITIVE RHEUMATOID ARTHRITIS: ICD-10-CM

## 2024-12-09 DIAGNOSIS — E78.2 MIXED HYPERLIPIDEMIA: ICD-10-CM

## 2024-12-09 DIAGNOSIS — Z79.899 ON LONG TERM DRUG THERAPY: ICD-10-CM

## 2024-12-09 DIAGNOSIS — M05.79 RHEUMATOID ARTHRITIS INVOLVING MULTIPLE SITES WITH POSITIVE RHEUMATOID FACTOR (MULTI): Primary | ICD-10-CM

## 2024-12-09 PROCEDURE — 1036F TOBACCO NON-USER: CPT | Performed by: INTERNAL MEDICINE

## 2024-12-09 PROCEDURE — 99205 OFFICE O/P NEW HI 60 MIN: CPT | Performed by: INTERNAL MEDICINE

## 2024-12-09 PROCEDURE — 99215 OFFICE O/P EST HI 40 MIN: CPT | Performed by: INTERNAL MEDICINE

## 2024-12-09 RX ORDER — ATORVASTATIN CALCIUM 10 MG/1
10 TABLET, FILM COATED ORAL DAILY
Qty: 90 TABLET | Refills: 1 | Status: SHIPPED | OUTPATIENT
Start: 2024-12-09

## 2024-12-09 ASSESSMENT — ENCOUNTER SYMPTOMS
DEPRESSION: 0
LOSS OF SENSATION IN FEET: 0
OCCASIONAL FEELINGS OF UNSTEADINESS: 0

## 2024-12-09 ASSESSMENT — PAIN SCALES - GENERAL: PAINLEVEL_OUTOF10: 0-NO PAIN

## 2024-12-09 NOTE — PROGRESS NOTES
"Subjective   Patient ID: [id number]   Bria Muniz is a 39 y.o. female who presents to -[facility].     HPI  She sees Dr. Burgos for Seropositive RA and is on Actemra 4mg/kg.  She reports she was diagnosed more than 10 years ago, prior to she got pregnant with her first child. Pain and swelling in her fingers, MCPs>PIPs that would spread to toes, wrists, elbows, toes, TMJ that would improve throughout the day and start over in AM and AM stiffness that would require a couple of hours to loosen up.   Not much in knees, ankles or hips.   She was on atorvastatin 10mg daily by Dr. Burgos.     Current rheum meds:  Actemra 4mg/kg for ~9 years    Prior rheum meds:  Pred  HCQ   methotrexate for approximately 6 months, was ineffective     No joint pains, \"I have pain only if I'm writing for too long\". Every once in a while gets a flare up in one joint but reports it's after she's overused that one joint. R>L (One shoulder at a time or one MCP), that is short lived and resolves the next day.  Also gets symptoms of CTS every once in a while that would resolve the next day.   Reports occ. L knee pain with going down steps that resolves with rest.   Was sick a month ago, but it was the first time she gets sick this year  No AM stiffness     ROS  Constitutional: Denies fever, chills, weight loss, night sweats or headaches  Eyes: + dry eyes. No blurry vision, redness or pain or photophobia  ENT: No dry mouth, dental loss, loss of taste, nasal or oral ulcers, difficulty swallowing, no recurrent sinus infections   Cardiovascular: Denies chest pain  Respiratory: Denies shortness of breath, cough, asthma, or recurrent respiratory infections  Gastrointestinal: Denies dysphagia, nausea, vomiting, heartburn, abdominal pain, constipation, diarrhea, melena or hematochezia  Genitourinary: No recurrent urinary infections or STDs, no genital or anal ulcers.  Integumentary: Denies photosensitivity, rash or lesions, Raynaud's phenomenon, " psoriatic lesions, or alopecia  Neurological: Denies any numbness or tingling, muscle weakness  Hematologic/Lymphatic: Denies bleeding, bruising, history of clots (arterial or venous). Had 1 chemical pregnancy or abortions/miscarriages/pregnancy complications   MSK: as above    PMH/PSH: RA, HLD, Eczema   Social: Nonsmoker, socially drinks alcohol, no drug use.  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 Has 2 children.   FHx: Father with RA.       Patient Active Problem List   Diagnosis    Seropositive rheumatoid arthritis of multiple sites (Multi)    Carpal tunnel syndrome of left wrist    Chronic fatigue syndrome    Complement 2 deficiency (Multi)    Defects in the complement system (Multi)    Fibromyalgia    Hyperlipidemia    Macrocytosis without anemia    Trigger finger    Alcoholic intoxication with complication (CMS-HCC)        Past Medical History:   Diagnosis Date    Seropositive rheumatoid arthritis of multiple sites (Multi) 10/27/2023        Past Surgical History:   Procedure Laterality Date    EXTERNAL EAR SURGERY          Social History     Socioeconomic History    Marital status:      Spouse name: Jalil    Number of children: 2    Years of education: Not on file    Highest education level: 12th grade   Occupational History    Not on file   Tobacco Use    Smoking status: Never     Passive exposure: Never    Smokeless tobacco: Never   Vaping Use    Vaping status: Never Used   Substance and Sexual Activity    Alcohol use: Yes     Alcohol/week: 1.0 standard drink of alcohol     Types: 1 Glasses of wine per week     Comment: social    Drug use: Never    Sexual activity: Defer   Other Topics Concern    Not on file   Social History Narrative    Not on file     Social Drivers of Health     Financial Resource Strain: Not on file   Food Insecurity: Not on file   Transportation Needs: Not on file   Physical Activity: Not on file   Stress: Not on file   Social Connections: Not on file   Intimate Partner  Violence: Not on file   Housing Stability: Not on file        Allergies   Allergen Reactions    Penicillins Hives    Tramadol Dizziness          Current Outpatient Medications:     acetaminophen (Tylenol) 500 mg capsule, Take 1 capsule (500 mg) by mouth every 6 hours if needed., Disp: , Rfl:     atorvastatin (Lipitor) 10 mg tablet, TAKE 1 TABLET(10 MG) BY MOUTH EVERY DAY, Disp: 90 tablet, Rfl: 1    fluocinonide 0.05 % cream, , Disp: , Rfl:     ibuprofen 200 mg tablet, Take 1 tablet (200 mg) by mouth every 8 hours if needed., Disp: , Rfl:     levonorgestrel (Mirena) 21 mcg/24 hours (8 yrs) 52 mg IUD, by intrauterine route., Disp: , Rfl:     tocilizumab (Actemra) 200 mg/10 mL (20 mg/mL) solution, as directed Intravenous, Disp: , Rfl:        Objective     Visit Vitals  /73   Pulse 87      Physical Exam  General: AAOx3, Cooperative  Head: normocephalic, atraumatic  Eyes: EOMI, conjunctiva clear, sclera white, anicteric  Ears: no redness, swelling, tenderness  Nose: no deformity, no crusting   Throat/Mouth: No oral deformities, no cheek swelling, mucosa appear moist, no oral ulcers noted or loss of dentition   Neck/Lymph: FROM, trachea midline  Neuro: CN II-XII grossly intact, no focal deficit  Skin: No rashes, ulcers or photosensitive areas  MSK: Upper Extremities:  Hand/Fingers: No erythema, swelling, tenderness or warmth at DIP, PIP, or MCP joints, FROM grossly. Good hand . No nodules. + Hitchhiker thumb.   Wrists: No erythema, swelling, warmth or tenderness at wrist, FROM grossly  Elbows: No tenderness, swelling, erythema or warmth at elbows, FROM grossly. No nodules   Shoulders:  FROM  Lower Extremities:   Hips: No obvious deformities.   Knees: No tenderness, deformities, swelling, rashes, or warmth, normal ROM grossly. + crepitus, no pes anserine bursa TTP   Ankles: No deformities, tenderness, edema, erythema, ulceration, or warmth at the ankle  Feet: Negative MTP squeeze. Normal ROM grossly.      Lab  "Results   Component Value Date    WBC 8.1 07/26/2024    HGB 13.9 07/26/2024    HCT 41.2 07/26/2024     (H) 07/26/2024     07/26/2024        Chemistry    Lab Results   Component Value Date/Time     (L) 10/02/2024 1128    K 4.4 10/02/2024 1128     10/02/2024 1128    CO2 25 10/02/2024 1128    BUN 10 10/02/2024 1128    CREATININE 0.61 10/02/2024 1128    Lab Results   Component Value Date/Time    CALCIUM 9.6 10/02/2024 1128    ALKPHOS 33 10/02/2024 1128    AST 28 10/02/2024 1128    ALT 22 10/02/2024 1128    BILITOT 0.8 10/02/2024 1128           Lab Results   Component Value Date    CRP <0.10 10/02/2024      Lab Results   Component Value Date    NILAM  06/26/2020     Negative  Reference range: NEGATIVE    Normal range : negative at <1:80 serum dilution.  Approximately 6% of patients with connective tissue diseases   with low positive EIA values are negative by IFA.    Recommend follow-up with specific antinuclear antibodies if   clinically indicated.  Test performed using Indirect Fluorescence Immunoassay   technology (IFA) using HEp-2 cells.      RF 71 (H) 12/04/2023    SEDRATE 5 10/02/2024      No results found for: \"CKTOTAL\"  Lab Results   Component Value Date    NEUTROABS 3.32 07/26/2024      No results found for: \"FERRITIN\"   Lab Results   Component Value Date    HEPAIGM Nonreactive 12/04/2023    HEPBCIGM Nonreactive 12/04/2023    HEPCAB Nonreactive 10/02/2024      Lab Results   Component Value Date    ALT 22 10/02/2024    AST 28 10/02/2024    ALKPHOS 33 10/02/2024    BILITOT 0.8 10/02/2024      No results found for: \"PPD\"   No results found for: \"URICACID\"   Lab Results   Component Value Date    CALCIUM 9.6 10/02/2024      No results found for: \"SPEP\", \"UPEP\"   No results found for: \"ALBUR\", \"[id number]\"     CT C spine 06/2023  FINDINGS:     No fracture or dislocation of the cervical spine is identified.  There is no prevertebral soft tissue swelling.  The intervertebral disc spaces are " well-maintained.  No bony central canal or neural foraminal stenosis is seen.     === 02/10/14 ===    XR HAND 3+ VW LEFT    - Impression -  Normal exam.    Interpreting Physician:   HETAL MATOS M.D.  Transcribed by/Date: PSCB   Feb 10 2014  2:10P  Electronically Signed by/Date: HETAL MATOS M.D. [id number]  Addendum Dictated by/Date: NO ADDENDUM  The physician noted as 'Electronically Signed by' may not be the physician  who interpreted the images. This physician may only be releasing the report  for printing according to hospital protocol.  end           Assessment/Plan    A 39 year old F with seropositive RA (RF, CCP+) here to re-establish care.     Her RA is in remission. No extra-articular manifestations.   XR hand from 2014 reviewed.   - Continue tocilizumab 4mg/kg. No pregnancy plans and has IUD.   - CBC, CMP, ESR, CRP to be done q3 months with infusions. Will also get vitamin D twice a year with infusions.   - Counseled extensively on seropositive RA and extra-articular manifestations.   - Counseled on ophthalmology evaluation  - HRCT to evaluate for ILD.   - Counseled on conservative measures if flexor stenosing tenosynovitis occurs including splinting, topical voltaren gel 1%. Had CSI in the past with no improvement.    RTC in 4 months or sooner as needed      Nita Hughes MD  Division of Rheumatology   [facility]

## 2024-12-11 ENCOUNTER — APPOINTMENT (OUTPATIENT)
Dept: INFUSION THERAPY | Facility: CLINIC | Age: 39
End: 2024-12-11
Payer: COMMERCIAL

## 2024-12-11 VITALS
SYSTOLIC BLOOD PRESSURE: 120 MMHG | WEIGHT: 110.7 LBS | HEART RATE: 82 BPM | BODY MASS INDEX: 22.36 KG/M2 | RESPIRATION RATE: 16 BRPM | DIASTOLIC BLOOD PRESSURE: 75 MMHG | TEMPERATURE: 98.7 F | OXYGEN SATURATION: 98 %

## 2024-12-11 DIAGNOSIS — M05.79 SEROPOSITIVE RHEUMATOID ARTHRITIS OF MULTIPLE SITES (MULTI): ICD-10-CM

## 2024-12-11 LAB — PREGNANCY TEST URINE, POC: NEGATIVE

## 2024-12-11 PROCEDURE — 81025 URINE PREGNANCY TEST: CPT | Performed by: NURSE PRACTITIONER

## 2024-12-11 PROCEDURE — 96365 THER/PROPH/DIAG IV INF INIT: CPT | Performed by: NURSE PRACTITIONER

## 2024-12-11 RX ORDER — DIPHENHYDRAMINE HYDROCHLORIDE 50 MG/ML
50 INJECTION INTRAMUSCULAR; INTRAVENOUS AS NEEDED
OUTPATIENT
Start: 2025-01-02

## 2024-12-11 RX ORDER — ALBUTEROL SULFATE 0.83 MG/ML
3 SOLUTION RESPIRATORY (INHALATION) AS NEEDED
OUTPATIENT
Start: 2025-01-02

## 2024-12-11 RX ORDER — EPINEPHRINE 0.3 MG/.3ML
0.3 INJECTION SUBCUTANEOUS EVERY 5 MIN PRN
OUTPATIENT
Start: 2025-01-02

## 2024-12-11 RX ORDER — FAMOTIDINE 10 MG/ML
20 INJECTION INTRAVENOUS ONCE AS NEEDED
OUTPATIENT
Start: 2025-01-02

## 2024-12-11 ASSESSMENT — ENCOUNTER SYMPTOMS
LEG SWELLING: 0
SLEEP DISTURBANCE: 0
COUGH: 0
HEADACHES: 0
VOMITING: 0
SHORTNESS OF BREATH: 0
FATIGUE: 0
NAUSEA: 0
CONSTIPATION: 0
DIZZINESS: 0
FEVER: 0
DEPRESSION: 0
FREQUENCY: 0
ARTHRALGIAS: 0
NERVOUS/ANXIOUS: 0
DYSURIA: 0
ABDOMINAL PAIN: 0
LIGHT-HEADEDNESS: 0
DIARRHEA: 0

## 2024-12-11 ASSESSMENT — PAIN SCALES - GENERAL: PAINLEVEL_OUTOF10: 0-NO PAIN

## 2024-12-11 NOTE — PATIENT INSTRUCTIONS
Today :We administered tocilizumab (Actemra) 200 mg in sodium chloride 0.9% 100 mL IV.     For:   1. Seropositive rheumatoid arthritis of multiple sites (Multi)         Your next appointment is due in:  1/08/2025        Please read the  Medication Guide that was given to you and reviewed during todays visit.     (Tell all doctors including dentists that you are taking this medication)     Go to the emergency room or call 911 if:  -You have signs of allergic reaction:   -Rash, hives, itching.   -Swollen, blistered, peeling skin.   -Swelling of face, lips, mouth, tongue or throat.   -Tightness of chest, trouble breathing, swallowing or talking     Call your doctor:  - If IV / injection site gets red, warm, swollen, itchy or leaks fluid or pus.     (Leave dressing on your IV site for at least 2 hours and keep area clean and dry  - If you get sick or have symptoms of infection or are not feeling well for any reason.    (Wash your hands often, stay away from people who are sick)  - If you have side effects from your medication that do not go away or are bothersome.     (Refer to the teaching your nurse gave you for side effects to call your doctor about)    - Common side effects may include:  stuffy nose, headache, feeling tired, muscle aches, upset stomach  - Before receiving any vaccines     - Call [facility] at   If:  - You get sick, are on antibiotics, have had a recent vaccine, have surgery or dental work and your doctor wants your visit rescheduled.  - You need to cancel and reschedule your visit for any reason. Call at least 2 days before your visit if you need to cancel.   - Your insurance changes before your next visit.    (We will need to get approval from your new insurance. This can take up to two weeks.)     The [facility] is opened Monday thru Friday. We are closed on weekends and holidays.   Voice mail will take your call if the center is closed. If you leave a message  please allow 24 hours for a call back during weekdays. If you leave a message on a weekend/holiday, we will call you back the next business day.    A pharmacist is available Monday - Friday from 8:30AM to 3:30PM to help answer any questions you may have about your prescriptions(s). Please call pharmacy at:    [facility]: [phone]  [facility]: [phone]  [facility]: [phone]

## 2024-12-11 NOTE — PROGRESS NOTES
[facility]   Infusion Clinic Note   Date: 2024   Name: Bria Muniz  : 1985   MRN: [id number]          Reason for Visit: OP Infusion (Actemra 200 mg every 28 days)         Today: We administered tocilizumab (Actemra) 200 mg in sodium chloride 0.9% 100 mL IV.       Visit Type: INFUSION       Ordered By: Dr. Hughes       Accompanied by:Self       Diagnosis: Seropositive rheumatoid arthritis of multiple sites (Multi)        Allergies:   Allergies as of 2024 - Reviewed 2024   Allergen Reaction Noted    Penicillins Hives 2023    Tramadol Dizziness 2023          Current Medications has a current medication list which includes the following prescription(s): acetaminophen, atorvastatin, ibuprofen, mirena, and actemra.       Vitals:   Vitals:    24 1304 24 1436   BP: 114/75 120/75   Pulse: 87 82   Resp: 18 16   Temp: 36.7 °C (98.1 °F) 37.1 °C (98.7 °F)   TempSrc: Temporal    SpO2: 98%    Weight: 50.2 kg (110 lb 11.2 oz)    PainSc: 0-No pain    LMP: 2024             Infusion Pre-procedure Checklist:   - Allergies reviewed: yes   - Medications reviewed: yes       - Previous reaction to current treatment: no      Assess patient for the concerns below. Document provider notification as appropriate.  - Active or recent infection with/without current antibiotic use: no  - Recent or planned invasive dental work: no  - Recent or planned surgeries: no  - Recently received or plans to receive vaccinations: no  - Has treatment related toxicities: no  - Is pregnant:  no LMP 2024  Urine HCG negative      Pain: 0   - Is the pain different from normal: no   - Is your Doctor aware:  n/a       Labs:  reviewed          Fall Risk Screening: Sakshi Fall Risk  History of Falling, Immediate or Within 3 Months: No  Secondary Diagnosis: Yes  Ambulatory Aid: Walks without aid/bedrest/nurse assist  Intravenous Therapy/Heparin Lock: No  Gait/Transferring:  Normal/bedrest/immobile       Review Of Systems:  Review of Systems   Constitutional:  Negative for fatigue and fever.   Respiratory:  Negative for cough and shortness of breath.    Cardiovascular:  Negative for chest pain and leg swelling.   Gastrointestinal:  Negative for abdominal pain, constipation, diarrhea, nausea and vomiting.   Genitourinary:  Negative for dysuria and frequency.    Musculoskeletal:  Negative for arthralgias and gait problem.   Neurological:  Negative for dizziness, gait problem, headaches and light-headedness.   Psychiatric/Behavioral:  Negative for depression and sleep disturbance. The patient is not nervous/anxious.          ROS completed? Yes      Infusion Readiness:  - Assessment Concerns Related to Infusion: No  - Provider notified: no      Document Below Only If Indicated:   New Patient Education:    N/A (returning patient for continuation of therapy. Ongoing education provided as needed.)        Treatment Conditions & Drug Specific Questions:    Tocilizumab  (ACTEMRA, TYENNE, TOFIDENCE)   (Unless otherwise specified on patient specific therapy plan):     TREATMENT CONDITIONS:  Unless otherwise specified on patient specific therapy plan HOLD and notify provider prior to proceeding with treatment if:   o Platelets LESS than 100 x 10E9L  o ANC LESS than 2 x 10E9/L  o ALT GREATER than 1.5x ULN and/or AST GREATER than 1.5x ULN  o Positive T-spot  o Reactive Hepatitis B Surface Antigen  -           Positive Pregnancy    Lab Results   Component Value Date     07/26/2024      Lab Results   Component Value Date    NEUTROABS 3.32 07/26/2024        Chemistry    Lab Results   Component Value Date/Time     (L) 10/02/2024 1128    K 4.4 10/02/2024 1128     10/02/2024 1128    CO2 25 10/02/2024 1128    BUN 10 10/02/2024 1128    CREATININE 0.61 10/02/2024 1128    Lab Results   Component Value Date/Time    CALCIUM 9.6 10/02/2024 1128    ALKPHOS 33 10/02/2024 1128    AST 28 10/02/2024  "1128    ALT 22 10/02/2024 1128    BILITOT 0.8 10/02/2024 1128        ,  Lab Results   Component Value Date    ALT 22 10/02/2024    AST 28 10/02/2024    ALKPHOS 33 10/02/2024    BILITOT 0.8 10/02/2024      Lab Results   Component Value Date    TBSIN Negative 10/02/2024    TBGRES Negative  Reference range: NEGATIVE   02/07/2022    TSPOTR  05/17/2023     Negative  Reference range: Normal Value: Negative    A negative test result does not exclude the possibility of exposure  to   or infection with Mycobacterium tuberculosis (M. tuberculosis).    Patients with recent exposure to TB infected individuals exhibiting a   negative T-SPOT.TB result should be considered for retesting within 6   weeks or if other relevant clinical symptoms indicate.  Results from   T-SPOT.TB testing must be used in conjunction with each individual's   epidemiological history, current medical status, and results of other   diagnostic evaluations.  The T-SPOT.TB test is qualitative and  results   are reported as positive, borderline or negative, given that the test   controls perform as expected. In line with the Centers for Disease   Control and Prevention's 2010 recommendation to report quantitative   measurements alongside the qualitative result, the laboratory  provides   spot counts for informational purposes only.  The T-SPOT.TB test  should   not be interpreted as a quantitative test.  Test performed at:  [place] [id number]      No results found for: \"NONUHFIRE\", \"NONUHSWGH\", \"NONUHFISH\", \"EXTHEPBSAG\"  Lab Results   Component Value Date    HEPBSAG Nonreactive 10/02/2024      Lab Results   Component Value Date    HEPAIGM Nonreactive 12/04/2023    HEPBCIGM Nonreactive 12/04/2023    HEPCAB Nonreactive 10/02/2024         Labs reviewed and patient meets treatment conditions? Yes    DRUG SPECIFIC QUESTIONS:  Any symptoms of (abdominal pain, distention, uncontrolled n/v) or new diagnosis of GI perforation? No  (If YES " notify prescribing provider prior to proceeding. Actemra may increase risk of bowel perforation)      REMINDERS:  - PREGNANCY CATEGORY X DRUG. OBTAIN NEGTATIVE PREGNANCY TEST PRIOR TO FIRST INFUSION FOR    WOMEN OF CHILDBEARING ABILITY   - WEIGHT BASED DRUG     Recommended Vitals/Observation:  Vitals: Monitor patient's vital signs prior to infusion start, every 30 minutes during infusion and 30 minutes after infusion.   Observation: Patient may leave 30 minutes post infusion.        Weight Based Drug Calculations:    WEIGHT BASED DRUGS: Tocilizumab (ACTEMRA)   Patient's dosing weight (kg): 50     10% weight variance for prescribed treatment: 45 kg to 55 kg     Patient's weight today:   Vitals:    12/11/24 1304   Weight: 50.2 kg (110 lb 11.2 oz)         weight range for prescribed dose:     Patient weight today falls outside of 10% variance or  weight range: No     Home [facility] pharmacist informed of weight variance: No    Doses that are weight based have an acceptable variance rule within 10% of the prescribed   order and/or within  weight range. If patient weight on day of infusion falls   outside of the 10% variance, or weight range, infusion is administered and   pharmacy contacted regarding future dosing adjustments, per policy.       1455  Patient tolerated infusion well.  Patient declining to stay for post infusion 30 minute observation.  VSS. No s/s adverse reaction noted.  RTC on 1/08/2025.        Initiated By: Kenia Arriaga RN

## 2025-01-08 ENCOUNTER — APPOINTMENT (OUTPATIENT)
Dept: INFUSION THERAPY | Facility: CLINIC | Age: 40
End: 2025-01-08
Payer: COMMERCIAL

## 2025-01-08 VITALS
TEMPERATURE: 98.6 F | OXYGEN SATURATION: 100 % | SYSTOLIC BLOOD PRESSURE: 105 MMHG | DIASTOLIC BLOOD PRESSURE: 75 MMHG | RESPIRATION RATE: 18 BRPM | WEIGHT: 108.6 LBS | BODY MASS INDEX: 21.93 KG/M2 | HEART RATE: 88 BPM

## 2025-01-08 DIAGNOSIS — M05.79 SEROPOSITIVE RHEUMATOID ARTHRITIS OF MULTIPLE SITES (MULTI): ICD-10-CM

## 2025-01-08 PROCEDURE — 96365 THER/PROPH/DIAG IV INF INIT: CPT | Performed by: NURSE PRACTITIONER

## 2025-01-08 RX ORDER — FAMOTIDINE 10 MG/ML
20 INJECTION INTRAVENOUS ONCE AS NEEDED
OUTPATIENT
Start: 2025-01-30

## 2025-01-08 RX ORDER — DIPHENHYDRAMINE HYDROCHLORIDE 50 MG/ML
50 INJECTION INTRAMUSCULAR; INTRAVENOUS AS NEEDED
OUTPATIENT
Start: 2025-01-30

## 2025-01-08 RX ORDER — EPINEPHRINE 0.3 MG/.3ML
0.3 INJECTION SUBCUTANEOUS EVERY 5 MIN PRN
OUTPATIENT
Start: 2025-01-30

## 2025-01-08 RX ORDER — ALBUTEROL SULFATE 0.83 MG/ML
3 SOLUTION RESPIRATORY (INHALATION) AS NEEDED
OUTPATIENT
Start: 2025-01-30

## 2025-01-08 ASSESSMENT — ENCOUNTER SYMPTOMS
COUGH: 0
MYALGIAS: 0
CONSTIPATION: 0
DIZZINESS: 0
FREQUENCY: 0
NAUSEA: 0
VOICE CHANGE: 0
FEVER: 0
DYSURIA: 0
TROUBLE SWALLOWING: 0
WHEEZING: 0
BLOOD IN STOOL: 0
EXTREMITY WEAKNESS: 0
UNEXPECTED WEIGHT CHANGE: 0
HEMATURIA: 0
PALPITATIONS: 0
APPETITE CHANGE: 0
LIGHT-HEADEDNESS: 0
VOMITING: 0
SHORTNESS OF BREATH: 0
ARTHRALGIAS: 0
HEADACHES: 0
DIARRHEA: 0
CHILLS: 0
FATIGUE: 0
EYE PROBLEMS: 0
WOUND: 0
NUMBNESS: 0
ABDOMINAL PAIN: 0
LEG SWELLING: 0
SORE THROAT: 0

## 2025-01-08 ASSESSMENT — PAIN SCALES - GENERAL: PAINLEVEL_OUTOF10: 0-NO PAIN

## 2025-01-08 NOTE — PATIENT INSTRUCTIONS
Today :We administered tocilizumab (Actemra) 200 mg in sodium chloride 0.9% 100 mL IV.     For:   1. Seropositive rheumatoid arthritis of multiple sites (Multi)         Your next appointment is due in:  28 days       Please read the  Medication Guide that was given to you and reviewed during todays visit.     (Tell all doctors including dentists that you are taking this medication)     Go to the emergency room or call 911 if:  -You have signs of allergic reaction:   -Rash, hives, itching.   -Swollen, blistered, peeling skin.   -Swelling of face, lips, mouth, tongue or throat.   -Tightness of chest, trouble breathing, swallowing or talking     Call your doctor:  - If IV / injection site gets red, warm, swollen, itchy or leaks fluid or pus.     (Leave dressing on your IV site for at least 2 hours and keep area clean and dry  - If you get sick or have symptoms of infection or are not feeling well for any reason.    (Wash your hands often, stay away from people who are sick)  - If you have side effects from your medication that do not go away or are bothersome.     (Refer to the teaching your nurse gave you for side effects to call your doctor about)    - Common side effects may include:  stuffy nose, headache, feeling tired, muscle aches, upset stomach  - Before receiving any vaccines     - [facility] at   If:  - You get sick, are on antibiotics, have had a recent vaccine, have surgery or dental work and your doctor wants your visit rescheduled.  - You need to cancel and reschedule your visit for any reason. Call at least 2 days before your visit if you need to cancel.   - Your insurance changes before your next visit.    (We will need to get approval from your new insurance. This can take up to two weeks.)     [facility] is opened Monday thru Friday. We are closed on weekends and holidays.   Voice mail will take your call if the center is closed. If you leave a message  please allow 24 hours for a call back during weekdays. If you leave a message on a weekend/holiday, we will call you back the next business day.    A pharmacist is available Monday - Friday from 8:30AM to 3:30PM to help answer any questions you may have about your prescriptions(s). Please call pharmacy at:    [facility]: [phone]  [facility]: [phone]  [facility]: [phone]

## 2025-01-08 NOTE — PROGRESS NOTES
[facility]   Infusion Clinic Note   Date: 2025   Name: Bria Muniz  : 1985   MRN: [id number]          Reason for Visit: OP Infusion (Actemra)         Today: We administered tocilizumab (Actemra) 200 mg in sodium chloride 0.9% 100 mL IV.       Visit Type: INFUSION       Ordered By:        Accompanied by:Self       Diagnosis: Seropositive rheumatoid arthritis of multiple sites (Multi)        Allergies:   Allergies as of 2025 - Reviewed 2025   Allergen Reaction Noted    Penicillins Hives 2023    Tramadol Dizziness 2023          Current Medications has a current medication list which includes the following prescription(s): acetaminophen, atorvastatin, ibuprofen, mirena, and actemra.       Vitals:   Vitals:    25 1003 25 1126   BP: 112/74 103/69   Pulse: 98 87   Resp: 18 18   Temp: 36.5 °C (97.7 °F) 37 °C (98.6 °F)   SpO2: 100% 100%   Weight: 49.3 kg (108 lb 9.6 oz)    PainSc: 0-No pain    LMP: 2024             Infusion Pre-procedure Checklist:   - Allergies reviewed: yes   - Medications reviewed: yes       - Previous reaction to current treatment: no      Assess patient for the concerns below. Document provider notification as appropriate.  - Active or recent infection with/without current antibiotic use: no  - Recent or planned invasive dental work: no  - Recent or planned surgeries: no  - Recently received or plans to receive vaccinations: no  - Has treatment related toxicities: no  - Is pregnant:  no      Pain: 0   - Is the pain different from normal: no   - Is your Doctor aware:  n/a       Labs: N/A          Fall Risk Screening: Cantor Fall Risk  History of Falling, Immediate or Within 3 Months: No  Secondary Diagnosis: Yes  Ambulatory Aid: Walks without aid/bedrest/nurse assist  Intravenous Therapy/Heparin Lock: Yes  Gait/Transferring: Normal/bedrest/immobile  Mental Status: Oriented to own ability  Cantor Fall Risk  Score: 35       Review Of Systems:  Review of Systems   Constitutional:  Negative for appetite change, chills, fatigue, fever and unexpected weight change.   HENT:   Negative for hearing loss, mouth sores, sore throat, tinnitus, trouble swallowing and voice change.    Eyes:  Negative for eye problems.   Respiratory:  Negative for cough, shortness of breath and wheezing.    Cardiovascular:  Negative for chest pain, leg swelling and palpitations.   Gastrointestinal:  Negative for abdominal pain, blood in stool, constipation, diarrhea, nausea and vomiting.   Genitourinary:  Negative for dysuria, frequency and hematuria.    Musculoskeletal:  Negative for arthralgias and myalgias.   Skin:  Negative for itching, rash and wound.   Neurological:  Negative for dizziness, extremity weakness, headaches, light-headedness and numbness.         ROS completed? Yes      Infusion Readiness:  - Assessment Concerns Related to Infusion: No  - Provider notified: n/a      Document Below Only If Indicated:   New Patient Education:    N/A (returning patient for continuation of therapy. Ongoing education provided as needed.)        Treatment Conditions & Drug Specific Questions:    Tocilizumab  (ACTEMRA, TYENNE, TOFIDENCE)   (Unless otherwise specified on patient specific therapy plan):     TREATMENT CONDITIONS:  Unless otherwise specified on patient specific therapy plan HOLD and notify provider prior to proceeding with treatment if:   o Platelets LESS than 100 x 10E9L  o ANC LESS than 2 x 10E9/L  o ALT GREATER than 1.5x ULN and/or AST GREATER than 1.5x ULN  o Positive T-spot  o Reactive Hepatitis B Surface Antigen  -           Positive Pregnancy    Lab Results   Component Value Date     07/26/2024      Lab Results   Component Value Date    NEUTROABS 3.32 07/26/2024        Chemistry    Lab Results   Component Value Date/Time     (L) 10/02/2024 1128    K 4.4 10/02/2024 1128     10/02/2024 1128    CO2 25 10/02/2024 1128     "BUN 10 10/02/2024 1128    CREATININE 0.61 10/02/2024 1128    Lab Results   Component Value Date/Time    CALCIUM 9.6 10/02/2024 1128    ALKPHOS 33 10/02/2024 1128    AST 28 10/02/2024 1128    ALT 22 10/02/2024 1128    BILITOT 0.8 10/02/2024 1128        ,  Lab Results   Component Value Date    ALT 22 10/02/2024    AST 28 10/02/2024    ALKPHOS 33 10/02/2024    BILITOT 0.8 10/02/2024      Lab Results   Component Value Date    TBSIN Negative 10/02/2024    TBGRES Negative  Reference range: NEGATIVE   02/07/2022    TSPOTR  05/17/2023     Negative  Reference range: Normal Value: Negative    A negative test result does not exclude the possibility of exposure  to   or infection with Mycobacterium tuberculosis (M. tuberculosis).    Patients with recent exposure to TB infected individuals exhibiting a   negative T-SPOT.TB result should be considered for retesting within 6   weeks or if other relevant clinical symptoms indicate.  Results from   T-SPOT.TB testing must be used in conjunction with each individual's   epidemiological history, current medical status, and results of other   diagnostic evaluations.  The T-SPOT.TB test is qualitative and  results   are reported as positive, borderline or negative, given that the test   controls perform as expected. In line with the Centers for Disease   Control and Prevention's 2010 recommendation to report quantitative   measurements alongside the qualitative result, the laboratory  provides   spot counts for informational purposes only.  The T-SPOT.TB test  should   not be interpreted as a quantitative test.  Test performed at:  [place] [address]      No results found for: \"NONUHFIRE\", \"NONUHSWGH\", \"NONUHFISH\", \"EXTHEPBSAG\"  Lab Results   Component Value Date    HEPBSAG Nonreactive 10/02/2024      Lab Results   Component Value Date    HEPAIGM Nonreactive 12/04/2023    HEPBCIGM Nonreactive 12/04/2023    HEPCAB Nonreactive 10/02/2024         Labs reviewed and " patient meets treatment conditions? Yes    DRUG SPECIFIC QUESTIONS:  Any symptoms of (abdominal pain, distention, uncontrolled n/v) or new diagnosis of GI perforation? No  (If YES notify prescribing provider prior to proceeding. Actemra may increase risk of bowel perforation)      REMINDERS:  - PREGNANCY CATEGORY X DRUG. OBTAIN NEGTATIVE PREGNANCY TEST PRIOR TO FIRST INFUSION FOR    WOMEN OF CHILDBEARING ABILITY   - WEIGHT BASED DRUG     Recommended Vitals/Observation:  Vitals: Monitor patient's vital signs prior to infusion start, every 30 minutes during infusion and 30 minutes after infusion.   Observation: Patient may leave 30 minutes post infusion. Observation complete.          Weight Based Drug Calculations:    WEIGHT BASED DRUGS: Tocilizumab (ACTEMRA)   Patient's dosing weight (kg): 50 kg    10% weight variance for prescribed treatment: 45 kg to 55 kg     Patient's weight today:   Vitals:    01/08/25 1003   Weight: 49.3 kg (108 lb 9.6 oz)         weight range for prescribed dose:     Patient weight today falls outside of 10% variance or  weight range: No    [facility] pharmacist informed of weight variance: Not applicable    Doses that are weight based have an acceptable variance rule within 10% of the prescribed   order and/or within  weight range. If patient weight on day of infusion falls   outside of the 10% variance, or weight range, infusion is administered and   pharmacy contacted regarding future dosing adjustments, per policy.         Initiated By: Teresa Neumann RN   _________________________________________________________________________    Note authored and patient cared for by: Teresa Neumann RN  Note/Encounter reviewed by: Calista AVILEZ NP. This provider on site at time of patient infusion. Infusion staff to notify this provider of any questions, concerns, abnormals or issues during infusion.    Final check of medication completed by this [facility] / or  on-site pharmacist with administering nurse using positive identification prior to administration. Final appearance of product checked for accuracy and conformity to the formula of the prepared product. Assured use of correct ingredients, accurate calculations and precise measurements under appropriate conditions and procedures.    No issues reported during today's encounter. Pt. tolerated infusion without difficulty. Pt. not independently evaluated by this provider during today's encounter.  (Calista AVILEZ NP)

## 2025-02-05 ENCOUNTER — APPOINTMENT (OUTPATIENT)
Dept: INFUSION THERAPY | Facility: CLINIC | Age: 40
End: 2025-02-05
Payer: COMMERCIAL

## 2025-02-05 VITALS
WEIGHT: 108.8 LBS | SYSTOLIC BLOOD PRESSURE: 107 MMHG | HEART RATE: 89 BPM | DIASTOLIC BLOOD PRESSURE: 73 MMHG | OXYGEN SATURATION: 100 % | RESPIRATION RATE: 18 BRPM | BODY MASS INDEX: 21.97 KG/M2 | TEMPERATURE: 98.6 F

## 2025-02-05 DIAGNOSIS — M05.79 SEROPOSITIVE RHEUMATOID ARTHRITIS OF MULTIPLE SITES (MULTI): ICD-10-CM

## 2025-02-05 LAB
BASOPHILS # BLD AUTO: 0.05 X10*3/UL (ref 0–0.1)
BASOPHILS NFR BLD AUTO: 0.7 %
EOSINOPHIL # BLD AUTO: 0.13 X10*3/UL (ref 0–0.7)
EOSINOPHIL NFR BLD AUTO: 1.8 %
ERYTHROCYTE [DISTWIDTH] IN BLOOD BY AUTOMATED COUNT: 12.2 % (ref 11.5–14.5)
ERYTHROCYTE [SEDIMENTATION RATE] IN BLOOD BY WESTERGREN METHOD: 8 MM/H (ref 0–20)
HCT VFR BLD AUTO: 40.5 % (ref 36–46)
HGB BLD-MCNC: 13.5 G/DL (ref 12–16)
IMM GRANULOCYTES # BLD AUTO: 0.02 X10*3/UL (ref 0–0.7)
IMM GRANULOCYTES NFR BLD AUTO: 0.3 % (ref 0–0.9)
LYMPHOCYTES # BLD AUTO: 1.68 X10*3/UL (ref 1.2–4.8)
LYMPHOCYTES NFR BLD AUTO: 23.8 %
MCH RBC QN AUTO: 34.3 PG (ref 26–34)
MCHC RBC AUTO-ENTMCNC: 33.3 G/DL (ref 32–36)
MCV RBC AUTO: 103 FL (ref 80–100)
MONOCYTES # BLD AUTO: 0.66 X10*3/UL (ref 0.1–1)
MONOCYTES NFR BLD AUTO: 9.3 %
NEUTROPHILS # BLD AUTO: 4.53 X10*3/UL (ref 1.2–7.7)
NEUTROPHILS NFR BLD AUTO: 64.1 %
NRBC BLD-RTO: 0 /100 WBCS (ref 0–0)
PLATELET # BLD AUTO: 327 X10*3/UL (ref 150–450)
PREGNANCY TEST URINE, POC: NEGATIVE
RBC # BLD AUTO: 3.94 X10*6/UL (ref 4–5.2)
WBC # BLD AUTO: 7.1 X10*3/UL (ref 4.4–11.3)

## 2025-02-05 PROCEDURE — 85025 COMPLETE CBC W/AUTO DIFF WBC: CPT

## 2025-02-05 PROCEDURE — 96365 THER/PROPH/DIAG IV INF INIT: CPT | Performed by: NURSE PRACTITIONER

## 2025-02-05 PROCEDURE — 81025 URINE PREGNANCY TEST: CPT | Performed by: NURSE PRACTITIONER

## 2025-02-05 PROCEDURE — 85652 RBC SED RATE AUTOMATED: CPT

## 2025-02-05 RX ORDER — EPINEPHRINE 0.3 MG/.3ML
0.3 INJECTION SUBCUTANEOUS EVERY 5 MIN PRN
OUTPATIENT
Start: 2025-03-05

## 2025-02-05 RX ORDER — FAMOTIDINE 10 MG/ML
20 INJECTION INTRAVENOUS ONCE AS NEEDED
OUTPATIENT
Start: 2025-03-05

## 2025-02-05 RX ORDER — ALBUTEROL SULFATE 0.83 MG/ML
3 SOLUTION RESPIRATORY (INHALATION) AS NEEDED
OUTPATIENT
Start: 2025-03-05

## 2025-02-05 RX ORDER — DIPHENHYDRAMINE HYDROCHLORIDE 50 MG/ML
50 INJECTION INTRAMUSCULAR; INTRAVENOUS AS NEEDED
OUTPATIENT
Start: 2025-03-05

## 2025-02-05 ASSESSMENT — ENCOUNTER SYMPTOMS
NUMBNESS: 0
WOUND: 0
LEG SWELLING: 0
ROS SKIN COMMENTS: DRY HANDS
PALPITATIONS: 0
COUGH: 0
SHORTNESS OF BREATH: 0
HEADACHES: 0
TROUBLE SWALLOWING: 0
APPETITE CHANGE: 0
SORE THROAT: 0
FREQUENCY: 0
MYALGIAS: 0
FEVER: 0
NAUSEA: 0
LIGHT-HEADEDNESS: 0
UNEXPECTED WEIGHT CHANGE: 0
HEMATURIA: 0
FATIGUE: 1
ABDOMINAL PAIN: 0
EYE PROBLEMS: 0
DIZZINESS: 0
DYSURIA: 0
VOICE CHANGE: 0
VOMITING: 0
CONSTIPATION: 0
CHILLS: 0
DIARRHEA: 0
NERVOUS/ANXIOUS: 1
EXTREMITY WEAKNESS: 0
ARTHRALGIAS: 0

## 2025-02-05 ASSESSMENT — PAIN SCALES - GENERAL: PAINLEVEL_OUTOF10: 0-NO PAIN

## 2025-02-05 NOTE — PATIENT INSTRUCTIONS
Today :We administered tocilizumab (Actemra) 200 mg in sodium chloride 0.9% 100 mL IV.     For:   1. Seropositive rheumatoid arthritis of multiple sites (Multi)         Your next appointment is due in:  3/05/2025        Please read the  Medication Guide that was given to you and reviewed during todays visit.     (Tell all doctors including dentists that you are taking this medication)     Go to the emergency room or call 911 if:  -You have signs of allergic reaction:   -Rash, hives, itching.   -Swollen, blistered, peeling skin.   -Swelling of face, lips, mouth, tongue or throat.   -Tightness of chest, trouble breathing, swallowing or talking     Call your doctor:  - If IV / injection site gets red, warm, swollen, itchy or leaks fluid or pus.     (Leave dressing on your IV site for at least 2 hours and keep area clean and dry  - If you get sick or have symptoms of infection or are not feeling well for any reason.    (Wash your hands often, stay away from people who are sick)  - If you have side effects from your medication that do not go away or are bothersome.     (Refer to the teaching your nurse gave you for side effects to call your doctor about)    - Common side effects may include:  stuffy nose, headache, feeling tired, muscle aches, upset stomach  - Before receiving any vaccines     - Call [facility] at   If:  - You get sick, are on antibiotics, have had a recent vaccine, have surgery or dental work and your doctor wants your visit rescheduled.  - You need to cancel and reschedule your visit for any reason. Call at least 2 days before your visit if you need to cancel.   - Your insurance changes before your next visit.    (We will need to get approval from your new insurance. This can take up to two weeks.)     [facility] is opened Monday thru Friday. We are closed on weekends and holidays.   Voice mail will take your call if the center is closed. If you leave a message  please allow 24 hours for a call back during weekdays. If you leave a message on a weekend/holiday, we will call you back the next business day.    A pharmacist is available Monday - Friday from 8:30AM to 3:30PM to help answer any questions you may have about your prescriptions(s). Please call pharmacy at:    [facility]: [phone]  [facility]: [phone]  [facility]: [phone]

## 2025-02-05 NOTE — PROGRESS NOTES
[facility]   Infusion Clinic Note   Date: 2025   Name: Bria Muniz  : 1985   MRN: [id number]          Reason for Visit: OP Infusion (ACTEMRA 200 MG EVERY 28 DAYS)         Today: We administered tocilizumab (Actemra) 200 mg in sodium chloride 0.9% 100 mL IV.       Visit Type: INFUSION- MAINTENANCE       Ordered By: MARJ Hughes M.D.       Accompanied by: Self       Diagnosis: Seropositive rheumatoid arthritis of multiple sites (Multi)        Allergies:   Allergies as of 2025 - Reviewed 2025   Allergen Reaction Noted    Penicillins Hives 2023    Tramadol Dizziness 2023          Current Medications: has a current medication list which includes the following prescription(s): acetaminophen, atorvastatin, ibuprofen, mirena, and actemra.       Vitals:   Vitals:    25 1029 25 1150   BP: 160/64 107/73   Pulse: 92 89   Resp: 18 18   Temp: 36.8 °C (98.3 °F) 37 °C (98.6 °F)   SpO2: 100% 100%   Weight: 49.4 kg (108 lb 12.8 oz)    PainSc: 0-No pain              Infusion Pre-procedure Checklist:   - Allergies & Medications reviewed: yes       - Previous reaction to current treatment: no      Assess patient for the concerns below. Document provider notification as appropriate.  - Active or recent infection with/without current antibiotic use: no  - Recent or planned invasive dental work: no  - Recent or planned surgeries: no  - Recently received or plans to receive vaccinations: no  - Has treatment related toxicities: no  - Any chance you may be pregnant:  no  Urin HCG negative      Pain: 0   - Is the pain different from normal: no   - Is your Doctor aware:  n/a       Labs: Labs drawn and sent per order          Fall Risk Screening: Sakshi Fall Risk  History of Falling, Immediate or Within 3 Months: Yes  Secondary Diagnosis: Yes  Intravenous Therapy/Heparin Lock: No  Gait/Transferring: Normal/bedrest/immobile  Mental Status: Oriented to own ability        Review Of Systems:  Review of Systems   Constitutional:  Positive for fatigue. Negative for appetite change, chills, fever and unexpected weight change.   HENT:   Negative for hearing loss, mouth sores, sore throat, tinnitus, trouble swallowing and voice change.    Eyes:  Negative for eye problems.   Respiratory:  Negative for cough and shortness of breath.    Cardiovascular:  Negative for chest pain, leg swelling and palpitations.   Gastrointestinal:  Negative for abdominal pain, constipation, diarrhea, nausea and vomiting.   Genitourinary:  Negative for dysuria, frequency and hematuria.    Musculoskeletal:  Negative for arthralgias and myalgias.   Skin:  Positive for rash. Negative for itching and wound.        Dry hands   Neurological:  Negative for dizziness, extremity weakness, headaches, light-headedness and numbness.   Psychiatric/Behavioral:  The patient is nervous/anxious.          ROS completed? Yes      Infusion Readiness:  - Assessment Concerns Related to Infusion: No  - Provider notified: no      Document Below Only If Indicated:   New Patient Education:    N/A (returning patient for continuation of therapy. Ongoing education provided as needed.)        Treatment Conditions & Drug Specific Questions:    Tocilizumab  (ACTEMRA, TYENNE, TOFIDENCE)   (Unless otherwise specified on patient specific therapy plan):     TREATMENT CONDITIONS:  Unless otherwise specified on patient specific therapy plan HOLD and notify provider prior to proceeding with treatment if:   o Platelets LESS than 100 x 10E9L  o ANC LESS than 2 x 10E9/L  o ALT GREATER than 1.5x ULN and/or AST GREATER than 1.5x ULN  o Positive T-spot  o Reactive Hepatitis B Surface Antigen  -           Positive Pregnancy    Lab Results   Component Value Date     07/26/2024      Lab Results   Component Value Date    NEUTROABS 3.32 07/26/2024        Chemistry    Lab Results   Component Value Date/Time     (L) 10/02/2024 1128    K 4.4  "10/02/2024 1128     10/02/2024 1128    CO2 25 10/02/2024 1128    BUN 10 10/02/2024 1128    CREATININE 0.61 10/02/2024 1128    Lab Results   Component Value Date/Time    CALCIUM 9.6 10/02/2024 1128    ALKPHOS 33 10/02/2024 1128    AST 28 10/02/2024 1128    ALT 22 10/02/2024 1128    BILITOT 0.8 10/02/2024 1128        ,  Lab Results   Component Value Date    ALT 22 10/02/2024    AST 28 10/02/2024    ALKPHOS 33 10/02/2024    BILITOT 0.8 10/02/2024      Lab Results   Component Value Date    TBSIN Negative 10/02/2024    TBGRES Negative  Reference range: NEGATIVE   02/07/2022    TSPOTR  05/17/2023     Negative  Reference range: Normal Value: Negative    A negative test result does not exclude the possibility of exposure  to   or infection with Mycobacterium tuberculosis (M. tuberculosis).    Patients with recent exposure to TB infected individuals exhibiting a   negative T-SPOT.TB result should be considered for retesting within 6   weeks or if other relevant clinical symptoms indicate.  Results from   T-SPOT.TB testing must be used in conjunction with each individual's   epidemiological history, current medical status, and results of other   diagnostic evaluations.  The T-SPOT.TB test is qualitative and  results   are reported as positive, borderline or negative, given that the test   controls perform as expected. In line with the Centers for Disease   Control and Prevention's 2010 recommendation to report quantitative   measurements alongside the qualitative result, the laboratory  provides   spot counts for informational purposes only.  The T-SPOT.TB test  should   not be interpreted as a quantitative test.  Test performed at:  [facility] [address] [facility] [address]      No results found for: \"NONUHFIRE\", \"NONUHSWGH\", \"NONUHFISH\", \"EXTHEPBSAG\"  Lab Results   Component Value Date    HEPBSAG Nonreactive 10/02/2024      Lab Results   Component Value Date    HEPAIGM Nonreactive 12/04/2023    HEPBCIGM " Nonreactive 12/04/2023    HEPCAB Nonreactive 10/02/2024         Labs reviewed and patient meets treatment conditions? Yes    DRUG SPECIFIC QUESTIONS:  Any symptoms of (abdominal pain, distention, uncontrolled n/v) or new diagnosis of GI perforation? No  (If YES notify prescribing provider prior to proceeding. Actemra may increase risk of bowel perforation)      REMINDERS:  - PREGNANCY CATEGORY X DRUG. OBTAIN NEGTATIVE PREGNANCY TEST PRIOR TO FIRST INFUSION FOR    WOMEN OF CHILDBEARING ABILITY   - WEIGHT BASED DRUG     Recommended Vitals/Observation:  Vitals: Monitor patient's vital signs prior to infusion start, every 30 minutes during infusion and 30 minutes after infusion.   Observation: Patient may leave 30 minutes post infusion. Not Applicable.          Weight Based Drug Calculations:    WEIGHT BASED DRUGS: Tocilizumab (ACTEMRA)   Patient's dosing weight (kg): 50     10% weight variance for prescribed treatment: 45 kg to 55 kg     Patient's weight today:   Vitals:    02/05/25 1029   Weight: 49.4 kg (108 lb 12.8 oz)         weight range for prescribed dose:     Patient weight today falls outside of 10% variance or  weight range: No     Home Care pharmacist informed of weight variance: No    Doses that are weight based have an acceptable variance rule within 10% of the prescribed   order and/or within  weight range. If patient weight on day of infusion falls   outside of the 10% variance, or weight range, infusion is administered and   pharmacy contacted regarding future dosing adjustments, per policy.     1235  Patient tolerated infusion well.  Patient declining observation.  VSS.  RTC on 3/05/2025.        Initiated By: Kenia Arriaga RN   _________________________________________________________________________    Note authored and patient cared for by: Kenia Arriaga RN  Note/Encounter reviewed by: Calista AVILEZ NP. This provider on site at time of patient  infusion. Infusion staff to notify this provider of any questions, concerns, abnormals or issues during infusion.    Final check of medication completed by this FATMATA / or on-site pharmacist with administering nurse using positive identification prior to administration. Final appearance of product checked for accuracy and conformity to the formula of the prepared product. Assured use of correct ingredients, accurate calculations and precise measurements under appropriate conditions and procedures.    No issues reported during today's encounter. Pt. tolerated infusion without difficulty. Pt. not independently evaluated by this provider during today's encounter.  (Calista Landaverde FATMATA NP)

## 2025-03-05 ENCOUNTER — APPOINTMENT (OUTPATIENT)
Dept: INFUSION THERAPY | Facility: CLINIC | Age: 40
End: 2025-03-05
Payer: COMMERCIAL

## 2025-03-05 VITALS
HEART RATE: 80 BPM | OXYGEN SATURATION: 97 % | RESPIRATION RATE: 18 BRPM | SYSTOLIC BLOOD PRESSURE: 113 MMHG | WEIGHT: 104.2 LBS | TEMPERATURE: 98.2 F | BODY MASS INDEX: 21.05 KG/M2 | DIASTOLIC BLOOD PRESSURE: 75 MMHG

## 2025-03-05 DIAGNOSIS — M05.79 SEROPOSITIVE RHEUMATOID ARTHRITIS OF MULTIPLE SITES (MULTI): ICD-10-CM

## 2025-03-05 LAB — PREGNANCY TEST URINE, POC: NEGATIVE

## 2025-03-05 PROCEDURE — 81025 URINE PREGNANCY TEST: CPT | Performed by: NURSE PRACTITIONER

## 2025-03-05 PROCEDURE — 96365 THER/PROPH/DIAG IV INF INIT: CPT | Performed by: NURSE PRACTITIONER

## 2025-03-05 RX ORDER — DIPHENHYDRAMINE HYDROCHLORIDE 50 MG/ML
50 INJECTION INTRAMUSCULAR; INTRAVENOUS AS NEEDED
OUTPATIENT
Start: 2025-04-02

## 2025-03-05 RX ORDER — ALBUTEROL SULFATE 0.83 MG/ML
3 SOLUTION RESPIRATORY (INHALATION) AS NEEDED
OUTPATIENT
Start: 2025-04-02

## 2025-03-05 RX ORDER — EPINEPHRINE 0.3 MG/.3ML
0.3 INJECTION SUBCUTANEOUS EVERY 5 MIN PRN
OUTPATIENT
Start: 2025-04-02

## 2025-03-05 RX ORDER — FAMOTIDINE 10 MG/ML
20 INJECTION, SOLUTION INTRAVENOUS ONCE AS NEEDED
OUTPATIENT
Start: 2025-04-02

## 2025-03-05 ASSESSMENT — PAIN SCALES - GENERAL: PAINLEVEL_OUTOF10: 0-NO PAIN

## 2025-03-05 ASSESSMENT — ENCOUNTER SYMPTOMS
APPETITE CHANGE: 0
DIARRHEA: 0
NAUSEA: 0
EYE PROBLEMS: 0
ARTHRALGIAS: 0
LEG SWELLING: 0
MYALGIAS: 0
HEADACHES: 0
CONSTIPATION: 0
VOICE CHANGE: 0
FEVER: 0
DIZZINESS: 0
FREQUENCY: 0
COUGH: 0
HEMATURIA: 0
NUMBNESS: 0
FATIGUE: 0
VOMITING: 0
UNEXPECTED WEIGHT CHANGE: 0
CHILLS: 0
WOUND: 0
EXTREMITY WEAKNESS: 0
PALPITATIONS: 0
SHORTNESS OF BREATH: 0
SORE THROAT: 0
ABDOMINAL PAIN: 0
WHEEZING: 0
TROUBLE SWALLOWING: 0
LIGHT-HEADEDNESS: 0
BLOOD IN STOOL: 0
DYSURIA: 0

## 2025-03-05 NOTE — PROGRESS NOTES
[facility]   Infusion Clinic Note   Date: 2025   Name: Bria Muniz  : 1985   MRN: [id number]         Reason for Visit: OP Infusion (Actemra 200 mg IV every 28 days)         Today: We administered tocilizumab (Actemra) 200 mg in sodium chloride 0.9% 100 mL IV.       Ordered By: Diego Burgos MD       For a Diagnosis of: Seropositive rheumatoid arthritis of multiple sites (Multi)       At today's visit patient accompanied by: Self      Vitals:   Vitals:    25 1005 25 1150   BP: 116/73 113/75   Pulse: 99 80   Resp: 18 18   Temp: 36.9 °C (98.5 °F) 36.8 °C (98.2 °F)   SpO2: 98% 97%   Weight: 47.3 kg (104 lb 3.2 oz)    PainSc: 0-No pain              Pre - Treatment Checklist:      - Previous reaction to current treatment: no      Assess patient for the concerns below. Document provider notification as appropriate.  - Active or recent infection with/without current antibiotic use: no  - Recent or planned invasive dental work: no  - Recent or planned surgeries: no  - Recently received or plans to receive vaccinations: no  - Has treatment related toxicities: no  - Any chance may be pregnant:  no   Negative pregnancy test    Pain: 0   - Is the pain different from normal: n/a   - Is prescribing Doctor aware:  n/a      Labs: Reviewed       Fall Risk Screening: Cantor Fall Risk  History of Falling, Immediate or Within 3 Months: No  Secondary Diagnosis: Yes  Ambulatory Aid: Walks without aid/bedrest/nurse assist  Intravenous Therapy/Heparin Lock: Yes  Gait/Transferring: Normal/bedrest/immobile  Mental Status: Oriented to own ability  Cantor Fall Risk Score: 35       Review Of Systems:  Review of Systems   Constitutional:  Negative for appetite change, chills, fatigue, fever and unexpected weight change.   HENT:   Negative for hearing loss, mouth sores, sore throat, tinnitus, trouble swallowing and voice change.    Eyes:  Negative for eye problems.   Respiratory:  Negative  for cough, shortness of breath and wheezing.    Cardiovascular:  Negative for chest pain, leg swelling and palpitations.   Gastrointestinal:  Negative for abdominal pain, blood in stool, constipation, diarrhea, nausea and vomiting.   Genitourinary:  Negative for dysuria, frequency and hematuria.    Musculoskeletal:  Negative for arthralgias and myalgias.   Skin:  Negative for itching, rash and wound.   Neurological:  Negative for dizziness, extremity weakness, headaches, light-headedness and numbness.         Infusion Readiness:  - Assessment Concerns Related to Infusion: No  - Provider notified: n/a      New Patient Education:    N/A (returning patient for continuation of therapy. Ongoing education provided as needed.)        Treatment Conditions & Drug Specific Questions:    Tocilizumab  (ACTEMRA, TYENNE, TOFIDENCE)   (Unless otherwise specified on patient specific therapy plan):     TREATMENT CONDITIONS:  Unless otherwise specified on patient specific therapy plan HOLD and notify provider prior to proceeding with treatment if:   o Platelets LESS than 100 x 10E9L  o ANC LESS than 2 x 10E9/L  o ALT GREATER than 1.5x ULN and/or AST GREATER than 1.5x ULN  o Positive T-spot  o Reactive Hepatitis B Surface Antigen  -           Positive Pregnancy    Lab Results   Component Value Date     02/05/2025      Lab Results   Component Value Date    NEUTROABS 4.53 02/05/2025            Chemistry    Lab Results   Component Value Date/Time     (L) 10/02/2024 1128    K 4.4 10/02/2024 1128     10/02/2024 1128    CO2 25 10/02/2024 1128    BUN 10 10/02/2024 1128    CREATININE 0.61 10/02/2024 1128    Lab Results   Component Value Date/Time    CALCIUM 9.6 10/02/2024 1128    ALKPHOS 33 10/02/2024 1128    AST 28 10/02/2024 1128    ALT 22 10/02/2024 1128    BILITOT 0.8 10/02/2024 1128        ,  Lab Results   Component Value Date    ALT 22 10/02/2024    AST 28 10/02/2024    ALKPHOS 33 10/02/2024    BILITOT 0.8 10/02/2024  "     Lab Results   Component Value Date    TBSIN Negative 10/02/2024    TBGRES Negative  Reference range: NEGATIVE   02/07/2022    TSPOTR  05/17/2023     Negative  Reference range: Normal Value: Negative    A negative test result does not exclude the possibility of exposure  to   or infection with Mycobacterium tuberculosis (M. tuberculosis).    Patients with recent exposure to TB infected individuals exhibiting a   negative T-SPOT.TB result should be considered for retesting within 6   weeks or if other relevant clinical symptoms indicate.  Results from   T-SPOT.TB testing must be used in conjunction with each individual's   epidemiological history, current medical status, and results of other   diagnostic evaluations.  The T-SPOT.TB test is qualitative and  results   are reported as positive, borderline or negative, given that the test   controls perform as expected. In line with the Centers for Disease   Control and Prevention's 2010 recommendation to report quantitative   measurements alongside the qualitative result, the laboratory  provides   spot counts for informational purposes only.  The T-SPOT.TB test  should   not be interpreted as a quantitative test.  Test performed at:  [place] [address]      No results found for: \"NONUHFIRE\", \"NONUHSWGH\", \"NONUHFISH\", \"EXTHEPBSAG\"  Lab Results   Component Value Date    HEPBSAG Nonreactive 10/02/2024      Lab Results   Component Value Date    HEPAIGM Nonreactive 12/04/2023    HEPBCIGM Nonreactive 12/04/2023    HEPCAB Nonreactive 10/02/2024         Patient meets treatment conditions? No    DRUG SPECIFIC QUESTIONS:  Any symptoms of (abdominal pain, distention, uncontrolled n/v) or new diagnosis of GI perforation? No  (If YES notify prescribing provider prior to proceeding. Actemra may increase risk of bowel perforation)      REMINDERS:  - PREGNANCY CATEGORY X DRUG. OBTAIN NEGTATIVE PREGNANCY TEST PRIOR TO FIRST INFUSION FOR    WOMEN OF " CHILDBEARING ABILITY   - WEIGHT BASED DRUG     Recommended Vitals/Observation:  Vitals: Monitor patient's vital signs prior to infusion start, every 30 minutes during infusion and 30 minutes after infusion.   Observation: Patient may leave 30 minutes post infusion. Observation complete.          Weight Based Drug Calculations:    WEIGHT BASED DRUGS: Tocilizumab (ACTEMRA)   Patient's dosing weight (kg): 50 kg    10% weight variance for prescribed treatment: 45 kg to 55 kg     Patient's weight today:   Vitals:    03/05/25 1005   Weight: 47.3 kg (104 lb 3.2 oz)         weight range for prescribed dose:     Patient weight today falls outside of 10% variance or  weight range: No     Home Care pharmacist informed of weight variance: Not applicable    Doses that are weight based have an acceptable variance rule within 10% of the prescribed   order and/or within  weight range. If patient weight on day of infusion falls   outside of the 10% variance, or weight range, infusion is administered and   pharmacy contacted regarding future dosing adjustments, per policy.      Post Treatment: Patient tolerated treatment without issue and was discharged in no apparent distress.      Note Authored / Patient Cared for By: Teresa Neumann RN  _________________________________________________________________________    Note authored and patient cared for by: Teresa Neumann RN  Note/Encounter reviewed by: Calista AVILEZ NP. This provider on site at time of patient infusion. Infusion staff to notify this provider of any questions, concerns, abnormals or issues during infusion.    Final check of medication completed by this FATMATA / or on-site pharmacist with administering nurse using positive identification prior to administration. Final appearance of product checked for accuracy and conformity to the formula of the prepared product. Assured use of correct ingredients, accurate calculations and  precise measurements under appropriate conditions and procedures.    No issues reported during today's encounter. Pt. tolerated infusion without difficulty. Pt. not independently evaluated by this provider during today's encounter.  (Calista AVILEZ NP)

## 2025-03-05 NOTE — PATIENT INSTRUCTIONS
Today :We administered tocilizumab (Actemra) 200 mg in sodium chloride 0.9% 100 mL IV.     For:   1. Seropositive rheumatoid arthritis of multiple sites (Multi)         Your next appointment is due in:  28 days     Please read the  Medication Guide that was given to you and reviewed during todays visit.     (Tell all doctors including dentists that you are taking this medication)     Go to the emergency room or call 911 if:  -You have signs of allergic reaction:   -Rash, hives, itching.   -Swollen, blistered, peeling skin.   -Swelling of face, lips, mouth, tongue or throat.   -Tightness of chest, trouble breathing, swallowing or talking     Call your doctor:  - If IV / injection site gets red, warm, swollen, itchy or leaks fluid or pus.     (Leave dressing on your IV site for at least 2 hours and keep area clean and dry  - If you get sick or have symptoms of infection or are not feeling well for any reason.    (Wash your hands often, stay away from people who are sick)  - If you have side effects from your medication that do not go away or are bothersome.     (Refer to the teaching your nurse gave you for side effects to call your doctor about)    - Common side effects may include:  stuffy nose, headache, feeling tired, muscle aches, upset stomach  - Before receiving any vaccines     - Call the [facility] at   If:  - You get sick, are on antibiotics, have had a recent vaccine, have surgery or dental work and your doctor wants your visit rescheduled.  - You need to cancel and reschedule your visit for any reason. Call at least 2 days before your visit if you need to cancel.   - Your insurance changes before your next visit.    (We will need to get approval from your new insurance. This can take up to two weeks.)     The [facility] is opened Monday thru Friday. We are closed on weekends and holidays.   Voice mail will take your call if the center is closed. If you leave a message  please allow 24 hours for a call back during weekdays. If you leave a message on a weekend/holiday, we will call you back the next business day.    A pharmacist is available Monday - Friday from 8:30AM to 3:30PM to help answer any questions you may have about your prescriptions(s). Please call pharmacy at:    [facility]: [phone]  [facility]: [phone]  [facility]: [phone]

## 2025-04-02 ENCOUNTER — APPOINTMENT (OUTPATIENT)
Dept: INFUSION THERAPY | Facility: CLINIC | Age: 40
End: 2025-04-02
Payer: COMMERCIAL

## 2025-04-02 VITALS
DIASTOLIC BLOOD PRESSURE: 62 MMHG | TEMPERATURE: 98.4 F | BODY MASS INDEX: 21.83 KG/M2 | SYSTOLIC BLOOD PRESSURE: 104 MMHG | OXYGEN SATURATION: 100 % | RESPIRATION RATE: 18 BRPM | HEART RATE: 78 BPM | WEIGHT: 108.1 LBS

## 2025-04-02 DIAGNOSIS — M05.79 SEROPOSITIVE RHEUMATOID ARTHRITIS OF MULTIPLE SITES (MULTI): ICD-10-CM

## 2025-04-02 LAB — PREGNANCY TEST URINE, POC: NEGATIVE

## 2025-04-02 PROCEDURE — 81025 URINE PREGNANCY TEST: CPT | Performed by: NURSE PRACTITIONER

## 2025-04-02 PROCEDURE — 96365 THER/PROPH/DIAG IV INF INIT: CPT | Performed by: NURSE PRACTITIONER

## 2025-04-02 RX ORDER — ALBUTEROL SULFATE 0.83 MG/ML
3 SOLUTION RESPIRATORY (INHALATION) AS NEEDED
OUTPATIENT
Start: 2025-04-30

## 2025-04-02 RX ORDER — DIPHENHYDRAMINE HYDROCHLORIDE 50 MG/ML
50 INJECTION, SOLUTION INTRAMUSCULAR; INTRAVENOUS AS NEEDED
OUTPATIENT
Start: 2025-04-30

## 2025-04-02 RX ORDER — EPINEPHRINE 0.3 MG/.3ML
0.3 INJECTION SUBCUTANEOUS EVERY 5 MIN PRN
OUTPATIENT
Start: 2025-04-30

## 2025-04-02 RX ORDER — FAMOTIDINE 10 MG/ML
20 INJECTION, SOLUTION INTRAVENOUS ONCE AS NEEDED
OUTPATIENT
Start: 2025-04-30

## 2025-04-02 ASSESSMENT — ENCOUNTER SYMPTOMS
CHILLS: 0
FATIGUE: 0
SHORTNESS OF BREATH: 0
COUGH: 0
LIGHT-HEADEDNESS: 0
WHEEZING: 0
DIARRHEA: 0
APPETITE CHANGE: 0
ABDOMINAL PAIN: 0
WOUND: 0
FEVER: 0
UNEXPECTED WEIGHT CHANGE: 0
NAUSEA: 0
LEG SWELLING: 0
EYE PROBLEMS: 0
CONSTIPATION: 0
NUMBNESS: 0
EXTREMITY WEAKNESS: 0
ARTHRALGIAS: 0
BRUISES/BLEEDS EASILY: 0
DIZZINESS: 0
DYSURIA: 0
HEMATURIA: 0
HEADACHES: 0
VOMITING: 0
FREQUENCY: 0
MYALGIAS: 0

## 2025-04-02 ASSESSMENT — PAIN SCALES - GENERAL: PAINLEVEL_OUTOF10: 0-NO PAIN

## 2025-04-02 NOTE — PROGRESS NOTES
[facility]   Infusion Clinic Note   Date: 2025   Name: Bria Muniz  : 1985   MRN: [id number]         Reason for Visit: OP Infusion (Actemra 200 mg every 28 days)         Today: We administered tocilizumab (Actemra) 200 mg in sodium chloride 0.9% 100 mL IV.       Ordered By: Dr. MARJ Hughes      For a Diagnosis of: Seropositive rheumatoid arthritis of multiple sites (Multi)       At today's visit patient accompanied by: Self      Today's Vitals:   Vitals:    25 0957 25 1057 25 1130 25 1155   BP: 112/73 106/71 106/59 104/62   Pulse: 78 100 81 78   Resp: 18 18 18 18   Temp: 36.4 °C (97.6 °F) 36.8 °C (98.3 °F) 36.9 °C (98.4 °F) 36.9 °C (98.4 °F)   SpO2: 100% 100% 100%    Weight: 49 kg (108 lb 1.6 oz)      PainSc: 0-No pain      LMP: 2025             Pre - Treatment Checklist:      - Previous reaction to current treatment: no      (Assess patient for the concerns below. Document provider notification as appropriate).  - Active or recent infection with/without current antibiotic use: no  - Recent or planned invasive dental work: no  - Recent or planned surgeries: no  - Recently received or plans to receive vaccinations: no  - Has treatment related toxicities: no  - Any chance may be pregnant:  no  Urine HCG negative today      Pain: 0   - Is the pain different from normal: no   - Is prescribing Doctor aware:  n/a      Labs: Reviewed       Fall Risk Screening: Cantor Fall Risk  History of Falling, Immediate or Within 3 Months: No  Secondary Diagnosis: Yes  Ambulatory Aid: Walks without aid/bedrest/nurse assist  Intravenous Therapy/Heparin Lock: No  Gait/Transferring: Normal/bedrest/immobile  Mental Status: Oriented to own ability  Cantor Fall Risk Score: 15       Review Of Systems:  Review of Systems   Constitutional:  Negative for appetite change, chills, fatigue, fever and unexpected weight change.   HENT:   Negative for hearing loss and mouth sores.     Eyes:  Negative for eye problems.   Respiratory:  Negative for cough, shortness of breath and wheezing.    Cardiovascular:  Negative for chest pain and leg swelling.   Gastrointestinal:  Negative for abdominal pain, constipation, diarrhea, nausea and vomiting.   Genitourinary:  Negative for dysuria, frequency and hematuria.    Musculoskeletal:  Negative for arthralgias and myalgias.   Skin:  Negative for itching, rash and wound.   Neurological:  Negative for dizziness, extremity weakness, headaches, light-headedness and numbness.   Hematological:  Does not bruise/bleed easily.         Infusion Readiness:  - Assessment Concerns Related to Infusion: No  - Provider notified: no      New Patient Education:    N/A (returning patient for continuation of therapy. Ongoing education provided as needed.)        Treatment Conditions & Drug Specific Questions:    Tocilizumab  (ACTEMRA, TYENNE, TOFIDENCE)   (Unless otherwise specified on patient specific therapy plan):     TREATMENT CONDITIONS:  Unless otherwise specified on patient specific therapy plan HOLD and notify provider prior to proceeding with treatment if:   o Platelets LESS than 100 x 10E9L  o ANC LESS than 2 x 10E9/L  o ALT GREATER than 1.5x ULN and/or AST GREATER than 1.5x ULN  o Positive T-spot  o Reactive Hepatitis B Surface Antigen  -           Positive Pregnancy    Lab Results   Component Value Date     02/05/2025      Lab Results   Component Value Date    NEUTROABS 4.53 02/05/2025            Chemistry    Lab Results   Component Value Date/Time     (L) 10/02/2024 1128    K 4.4 10/02/2024 1128     10/02/2024 1128    CO2 25 10/02/2024 1128    BUN 10 10/02/2024 1128    CREATININE 0.61 10/02/2024 1128    Lab Results   Component Value Date/Time    CALCIUM 9.6 10/02/2024 1128    ALKPHOS 33 10/02/2024 1128    AST 28 10/02/2024 1128    ALT 22 10/02/2024 1128    BILITOT 0.8 10/02/2024 1128        ,  Lab Results   Component Value Date    ALT 22  "10/02/2024    AST 28 10/02/2024    ALKPHOS 33 10/02/2024    BILITOT 0.8 10/02/2024      Lab Results   Component Value Date    TBSIN Negative 10/02/2024    TBGRES Negative  Reference range: NEGATIVE   02/07/2022    TSPOTR  05/17/2023     Negative  Reference range: Normal Value: Negative    A negative test result does not exclude the possibility of exposure  to   or infection with Mycobacterium tuberculosis (M. tuberculosis).    Patients with recent exposure to TB infected individuals exhibiting a   negative T-SPOT.TB result should be considered for retesting within 6   weeks or if other relevant clinical symptoms indicate.  Results from   T-SPOT.TB testing must be used in conjunction with each individual's   epidemiological history, current medical status, and results of other   diagnostic evaluations.  The T-SPOT.TB test is qualitative and  results   are reported as positive, borderline or negative, given that the test   controls perform as expected. In line with the Centers for Disease   Control and Prevention's 2010 recommendation to report quantitative   measurements alongside the qualitative result, the laboratory  provides   spot counts for informational purposes only.  The T-SPOT.TB test  should   not be interpreted as a quantitative test.  Test performed at:  [facility] [address] [address]      No results found for: \"NONUHFIRE\", \"NONUHSWGH\", \"NONUHFISH\", \"EXTHEPBSAG\"  Lab Results   Component Value Date    HEPBSAG Nonreactive 10/02/2024      Lab Results   Component Value Date    HEPAIGM Nonreactive 12/04/2023    HEPBCIGM Nonreactive 12/04/2023    HEPCAB Nonreactive 10/02/2024         Patient meets treatment conditions? Yes    DRUG SPECIFIC QUESTIONS:  Any symptoms of (abdominal pain, distention, uncontrolled n/v) or new diagnosis of GI perforation? No  (If YES notify prescribing provider prior to proceeding. Actemra may increase risk of bowel perforation)      REMINDERS:  - PREGNANCY CATEGORY X " DRUG. OBTAIN NEGTATIVE PREGNANCY TEST PRIOR TO FIRST INFUSION FOR    WOMEN OF CHILDBEARING ABILITY   - WEIGHT BASED DRUG     Recommended Vitals/Observation:  Vitals: Monitor patient's vital signs prior to infusion start, every 30 minutes during infusion and 30 minutes after infusion.   Observation: Patient may leave 30 minutes post infusion. Patient declined to stay for observation. Educated on signs and symptoms to monitor for and when to present to the ED         Weight Based Drug Calculations:    WEIGHT BASED DRUGS: Tocilizumab (ACTEMRA)   Patient's dosing weight (kg): 50     10% weight variance for prescribed treatment: 45 kg to 55 kg     Patient's weight today:   Vitals:    04/02/25 0957   Weight: 49 kg (108 lb 1.6 oz)         weight range for prescribed dose:     Patient weight today falls outside of 10% variance or  weight range: No     Home Care pharmacist informed of weight variance: No    Doses that are weight based have an acceptable variance rule within 10% of the prescribed   order and/or within  weight range. If patient weight on day of infusion falls   outside of the 10% variance, or weight range, infusion is administered and   pharmacy contacted regarding future dosing adjustments, per policy.      Post Treatment: Patient tolerated treatment without issue and was discharged in no apparent distress.  RTC on 4/30/2025.      Note Authored / Patient Cared for By: Kenia Arriaga RN   _________________________________________________________________________    Note authored and patient cared for by: Kenia Arriaga RN  Note/Encounter reviewed by: Calista AVILEZ NP. This provider on site at time of patient infusion. Infusion staff to notify this provider of any questions, concerns, abnormals or issues during infusion.    Final check of medication completed by this [facility] / or on-site pharmacist with administering nurse using positive identification prior to  administration. Final appearance of product checked for accuracy and conformity to the formula of the prepared product. Assured use of correct ingredients, accurate calculations and precise measurements under appropriate conditions and procedures.    No issues reported during today's encounter. Pt. tolerated infusion without difficulty. Pt. not independently evaluated by this provider during today's encounter.  (Calista AVILEZ NP)

## 2025-04-02 NOTE — PATIENT INSTRUCTIONS
Today :Bria Muniz had no medications administered during this visit.     For:   1. Seropositive rheumatoid arthritis of multiple sites (Multi)         Your next appointment is due in:  4/30/2025        Please read the  Medication Guide that was given to you and reviewed during todays visit.     (Tell all doctors including dentists that you are taking this medication)     Go to the emergency room or call 911 if:  -You have signs of allergic reaction:   -Rash, hives, itching.   -Swollen, blistered, peeling skin.   -Swelling of face, lips, mouth, tongue or throat.   -Tightness of chest, trouble breathing, swallowing or talking     Call your doctor:  - If IV / injection site gets red, warm, swollen, itchy or leaks fluid or pus.     (Leave dressing on your IV site for at least 2 hours and keep area clean and dry  - If you get sick or have symptoms of infection or are not feeling well for any reason.    (Wash your hands often, stay away from people who are sick)  - If you have side effects from your medication that do not go away or are bothersome.     (Refer to the teaching your nurse gave you for side effects to call your doctor about)    - Common side effects may include:  stuffy nose, headache, feeling tired, muscle aches, upset stomach  - Before receiving any vaccines     - Call [facility] at   If:  - You get sick, are on antibiotics, have had a recent vaccine, have surgery or dental work and your doctor wants your visit rescheduled.  - You need to cancel and reschedule your visit for any reason. Call at least 2 days before your visit if you need to cancel.   - Your insurance changes before your next visit.    (We will need to get approval from your new insurance. This can take up to two weeks.)     The [facility] is opened Monday thru Friday. We are closed on weekends and holidays.   Voice mail will take your call if the center is closed. If you leave a message please  allow 24 hours for a call back during weekdays. If you leave a message on a weekend/holiday, we will call you back the next business day.    A pharmacist is available Monday - Friday from 8:30AM to 3:30PM to help answer any questions you may have about your prescriptions(s). Please call pharmacy at:    [facility]: [phone]  [facility]: [phone]  [facility]: [phone]

## 2025-04-14 ENCOUNTER — OFFICE VISIT (OUTPATIENT)
Dept: RHEUMATOLOGY | Facility: CLINIC | Age: 40
End: 2025-04-14
Payer: COMMERCIAL

## 2025-04-14 VITALS
OXYGEN SATURATION: 100 % | HEART RATE: 80 BPM | SYSTOLIC BLOOD PRESSURE: 117 MMHG | DIASTOLIC BLOOD PRESSURE: 65 MMHG | BODY MASS INDEX: 21.81 KG/M2 | WEIGHT: 108 LBS

## 2025-04-14 DIAGNOSIS — M05.79 SEROPOSITIVE RHEUMATOID ARTHRITIS OF MULTIPLE SITES (MULTI): Primary | ICD-10-CM

## 2025-04-14 DIAGNOSIS — Z79.899 ON LONG TERM DRUG THERAPY: ICD-10-CM

## 2025-04-14 PROCEDURE — 1036F TOBACCO NON-USER: CPT | Performed by: INTERNAL MEDICINE

## 2025-04-14 PROCEDURE — 99215 OFFICE O/P EST HI 40 MIN: CPT | Performed by: INTERNAL MEDICINE

## 2025-04-14 ASSESSMENT — ENCOUNTER SYMPTOMS
LOSS OF SENSATION IN FEET: 0
DEPRESSION: 0
OCCASIONAL FEELINGS OF UNSTEADINESS: 0

## 2025-04-14 ASSESSMENT — PAIN SCALES - GENERAL: PAINLEVEL_OUTOF10: 0-NO PAIN

## 2025-04-14 NOTE — PROGRESS NOTES
"Subjective   Patient ID: [id number]  Bria Muniz is a 39 y.o. female who presents for No chief complaint on file..  HPI  PMH/PSH: RA, HLD, Eczema   Social: Nonsmoker, socially drinks alcohol, no drug use.  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 Has 2 children.   FHx: Father with RA.     Recall:  Dr. Burgos for Seropositive RA and is on Actemra 4mg/kg.  She reports she was diagnosed more than 10 years ago, prior to she got pregnant with her first child. Pain and swelling in her fingers, MCPs>PIPs that would spread to toes, wrists, elbows, toes, TMJ that would improve throughout the day and start over in AM and AM stiffness that would require a couple of hours to loosen up.   Not much in knees, ankles or hips.   She was on atorvastatin 10mg daily by Dr. Burgos.     Current rheum meds:  Actemra 4mg/kg since ~2016     Prior rheum meds:  Pred  HCQ   methotrexate for approximately 6 months, was ineffective     No joint pains, \"I have pain only if I'm writing for too long\". Every once in a while gets a flare up in one joint but reports it's after she's overused that one joint. R>L (One shoulder at a time or one MCP or wrist), that is short lived and resolves the next day.  Also gets symptoms of CTS every once in a while that would resolve the next day.   Reports occ. L knee pain with going down steps that resolves with rest.   No AM stiffness     Denies fever, chills, weight loss, night sweats or headaches. + dry eyes. No blurry vision, redness or pain or photophobia. No dry mouth, dental loss, loss of taste, nasal or oral ulcers, difficulty swallowing, no recurrent sinus infections. Denies chest pain. Denies shortness of breath, cough, asthma, or recurrent respiratory infections. Denies dysphagia, nausea, vomiting, heartburn, abdominal pain, constipation, diarrhea, melena or hematochezia. No recurrent urinary infections or STDs, no genital or anal ulcers. Denies photosensitivity, rash or lesions, Raynaud's " phenomenon, psoriatic lesions, or alopecia. Denies any numbness or tingling, muscle weakness. Denies bleeding, bruising, history of clots (arterial or venous). 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)    Fibromyalgia    Hyperlipidemia    Macrocytosis without anemia    Trigger finger    Alcoholic intoxication with complication       Past Medical History:   Diagnosis Date    Seropositive rheumatoid arthritis of multiple sites (Multi) 10/27/2023       Past Surgical History:   Procedure Laterality Date    EXTERNAL EAR SURGERY         Social History     Socioeconomic History    Marital status:      Spouse name: Jalil    Number of children: 2    Years of education: Not on file    Highest education level: 12th grade   Occupational History    Not on file   Tobacco Use    Smoking status: Never     Passive exposure: Never    Smokeless tobacco: Never   Vaping Use    Vaping status: Never Used   Substance and Sexual Activity    Alcohol use: Yes     Alcohol/week: 1.0 standard drink of alcohol     Types: 1 Glasses of wine per week     Comment: social    Drug use: Never    Sexual activity: Defer   Other Topics Concern    Not on file   Social History Narrative    Not on file     Social Drivers of Health     Financial Resource Strain: Not on file   Food Insecurity: Not on file   Transportation Needs: Not on file   Physical Activity: Not on file   Stress: Not on file   Social Connections: Not on file   Intimate Partner Violence: Not on file   Housing Stability: Not on file       Allergies   Allergen Reactions    Penicillins Hives    Tramadol Dizziness         Current Outpatient Medications:     acetaminophen (Tylenol) 500 mg capsule, Take 1  capsule (500 mg) by mouth every 6 hours if needed., Disp: , Rfl:     atorvastatin (Lipitor) 10 mg tablet, Take 1 tablet (10 mg) by mouth once daily., Disp: 90 tablet, Rfl: 1    ibuprofen 200 mg tablet, Take 1 tablet (200 mg) by mouth every 8 hours if needed., Disp: , Rfl:     levonorgestrel (Mirena) 21 mcg/24 hours (8 yrs) 52 mg IUD, by intrauterine route., Disp: , Rfl:     tocilizumab (Actemra) 200 mg/10 mL (20 mg/mL) solution, as directed Intravenous, Disp: , Rfl:     Objective     Visit Vitals  /65 (BP Location: Left arm, Patient Position: Sitting)   Pulse 80       Physical Exam  Copied from previous exam unless annotated below   General: AAOx3, Cooperative  Head: normocephalic, atraumatic  Eyes: EOMI, conjunctiva clear, sclera white, anicteric  Throat/Mouth: No oral deformities, no cheek swelling, mucosa appear moist  Neck/Lymph: FROM, trachea midline  Neuro: CN II-XII grossly intact, no focal deficit  Skin: No rashes, ulcers or photosensitive areas  MSK: Upper Extremities:  Hand/Fingers: No erythema, swelling, tenderness or warmth at DIP, PIP, or MCP joints, FROM grossly. Good hand . No nodules. + Hitchhiker thumb.   Wrists: No erythema, swelling, warmth or tenderness at wrist, FROM grossly  Elbows: No tenderness, swelling, erythema or warmth at elbows, FROM grossly. No nodules   Shoulders:  FROM  Lower Extremities:   Hips: No obvious deformities.   Knees: No tenderness, deformities, swelling, rashes, or warmth, normal ROM grossly. + crepitus, no pes anserine bursa TTP   Ankles: No deformities, tenderness, edema, erythema, ulceration, or warmth at the ankle  Feet: Negative MTP squeeze. Normal ROM grossly.         Lab Results   Component Value Date    WBC 7.1 02/05/2025    HGB 13.5 02/05/2025    HCT 40.5 02/05/2025     (H) 02/05/2025     02/05/2025       Chemistry    Lab Results   Component Value Date/Time     (L) 10/02/2024 1128    K 4.4 10/02/2024 1128     10/02/2024 1128     CO2 25 10/02/2024 1128    BUN 10 10/02/2024 1128    CREATININE 0.61 10/02/2024 1128    Lab Results   Component Value Date/Time    CALCIUM 9.6 10/02/2024 1128    ALKPHOS 33 10/02/2024 1128    AST 28 10/02/2024 1128    ALT 22 10/02/2024 1128    BILITOT 0.8 10/02/2024 1128          Lab Results   Component Value Date    CRP <0.10 10/02/2024    NILAM  06/26/2020     Negative  Reference range: NEGATIVE    Normal range : negative at <1:80 serum dilution.  Approximately 6% of patients with connective tissue diseases   with low positive EIA values are negative by IFA.    Recommend follow-up with specific antinuclear antibodies if   clinically indicated.  Test performed using Indirect Fluorescence Immunoassay   technology (IFA) using HEp-2 cells.      CALCIUM 9.6 10/02/2024     Alkaline Phosphatase   Date Value Ref Range Status   10/02/2024 33 33 - 110 U/L Final     AST   Date Value Ref Range Status   10/02/2024 28 9 - 39 U/L Final     Comment:     MILD HEMOLYSIS DETECTED. The result may be falsely elevated due to hemolysis or other interferents. Clinical correlation is recommended. Repeat testing may be considered.     Urea Nitrogen   Date Value Ref Range Status   10/02/2024 10 6 - 23 mg/dL Final     Sodium   Date Value Ref Range Status   10/02/2024 135 (L) 136 - 145 mmol/L Final     Potassium   Date Value Ref Range Status   10/02/2024 4.4 3.5 - 5.3 mmol/L Final     Comment:     MILD HEMOLYSIS DETECTED. The result may be falsely elevated due to hemolysis or other interferents. Clinical correlation is recommended. Repeat testing may be considered.     Bicarbonate   Date Value Ref Range Status   10/02/2024 25 21 - 32 mmol/L Final     ALT   Date Value Ref Range Status   10/02/2024 22 7 - 45 U/L Final     Comment:     Patients treated with Sulfasalazine may generate falsely decreased results for ALT.     === 02/10/14 ===     XR HAND 3+ VW LEFT     - Impression -  Normal exam.     Interpreting Physician:   HETAL MATOS  M.D.  Transcribed by/Date: DILLAN   Feb 10 2014  2:10P  Electronically Signed by/Date: HETAL MATOS M.D. [id number]  Addendum Dictated by/Date: NO ADDENDUM  The physician noted as 'Electronically Signed by' may not be the physician  who interpreted the images. This physician may only be releasing the report  for printing according to hospital protocol.  end     Assessment/Plan   seropositive RA (RF, CCP+) here for follow up     Her RA is in remission. No extra-articular manifestations.   XR hand from 2014 reviewed.     Monitoring labs rom 2/5/2025 reviewed     - Continue tocilizumab 4mg/kg. No pregnancy plans and has IUD.   - CBC, CMP, ESR, CRP to be done q3 months with infusions. Will also get vitamin D twice a year with infusions.   - Counseled on seropositive RA and extra-articular manifestations.   - Counseled on ophthalmology evaluation  - HRCT to evaluate for ILD, not scheduled yet.   - Counseled on conservative measures if flexor stenosing tenosynovitis occurs including splinting, topical voltaren gel 1%. Had CSI in the past with no improvement.     RTC in 6 months or sooner as needed        Nita Hughes MD  Division of Rheumatology   [facility]

## 2025-04-30 ENCOUNTER — APPOINTMENT (OUTPATIENT)
Dept: INFUSION THERAPY | Facility: CLINIC | Age: 40
End: 2025-04-30
Payer: COMMERCIAL

## 2025-04-30 VITALS
DIASTOLIC BLOOD PRESSURE: 71 MMHG | OXYGEN SATURATION: 100 % | TEMPERATURE: 98.5 F | WEIGHT: 110 LBS | BODY MASS INDEX: 22.22 KG/M2 | SYSTOLIC BLOOD PRESSURE: 104 MMHG | RESPIRATION RATE: 16 BRPM | HEART RATE: 70 BPM

## 2025-04-30 DIAGNOSIS — M05.79 SEROPOSITIVE RHEUMATOID ARTHRITIS OF MULTIPLE SITES (MULTI): ICD-10-CM

## 2025-04-30 LAB
25(OH)D3 SERPL-MCNC: 26 NG/ML (ref 30–100)
ALBUMIN SERPL BCP-MCNC: 5.1 G/DL (ref 3.4–5)
ALP SERPL-CCNC: 37 U/L (ref 33–110)
ALT SERPL W P-5'-P-CCNC: 20 U/L (ref 7–45)
ANION GAP SERPL CALCULATED.3IONS-SCNC: 13 MMOL/L (ref 10–20)
AST SERPL W P-5'-P-CCNC: 28 U/L (ref 9–39)
BASOPHILS # BLD AUTO: 0.04 X10*3/UL (ref 0–0.1)
BASOPHILS NFR BLD AUTO: 0.8 %
BILIRUB SERPL-MCNC: 1.1 MG/DL (ref 0–1.2)
BUN SERPL-MCNC: 12 MG/DL (ref 6–23)
CALCIUM SERPL-MCNC: 9.6 MG/DL (ref 8.6–10.3)
CHLORIDE SERPL-SCNC: 102 MMOL/L (ref 98–107)
CO2 SERPL-SCNC: 24 MMOL/L (ref 21–32)
CREAT SERPL-MCNC: 0.67 MG/DL (ref 0.5–1.05)
CRP SERPL-MCNC: <0.1 MG/DL
EGFRCR SERPLBLD CKD-EPI 2021: >90 ML/MIN/1.73M*2
EOSINOPHIL # BLD AUTO: 0.1 X10*3/UL (ref 0–0.7)
EOSINOPHIL NFR BLD AUTO: 2.1 %
ERYTHROCYTE [DISTWIDTH] IN BLOOD BY AUTOMATED COUNT: 11.9 % (ref 11.5–14.5)
ERYTHROCYTE [SEDIMENTATION RATE] IN BLOOD BY WESTERGREN METHOD: 4 MM/H (ref 0–20)
GLUCOSE SERPL-MCNC: 90 MG/DL (ref 74–99)
HCT VFR BLD AUTO: 40.2 % (ref 36–46)
HGB BLD-MCNC: 13.4 G/DL (ref 12–16)
IMM GRANULOCYTES # BLD AUTO: 0.01 X10*3/UL (ref 0–0.7)
IMM GRANULOCYTES NFR BLD AUTO: 0.2 % (ref 0–0.9)
LYMPHOCYTES # BLD AUTO: 1.49 X10*3/UL (ref 1.2–4.8)
LYMPHOCYTES NFR BLD AUTO: 31.6 %
MCH RBC QN AUTO: 34.4 PG (ref 26–34)
MCHC RBC AUTO-ENTMCNC: 33.3 G/DL (ref 32–36)
MCV RBC AUTO: 103 FL (ref 80–100)
MONOCYTES # BLD AUTO: 0.48 X10*3/UL (ref 0.1–1)
MONOCYTES NFR BLD AUTO: 10.2 %
NEUTROPHILS # BLD AUTO: 2.6 X10*3/UL (ref 1.2–7.7)
NEUTROPHILS NFR BLD AUTO: 55.1 %
NRBC BLD-RTO: 0 /100 WBCS (ref 0–0)
PLATELET # BLD AUTO: 329 X10*3/UL (ref 150–450)
POTASSIUM SERPL-SCNC: 4.4 MMOL/L (ref 3.5–5.3)
PREGNANCY TEST URINE, POC: NEGATIVE
PROT SERPL-MCNC: 7.8 G/DL (ref 6.4–8.2)
RBC # BLD AUTO: 3.89 X10*6/UL (ref 4–5.2)
SODIUM SERPL-SCNC: 135 MMOL/L (ref 136–145)
WBC # BLD AUTO: 4.7 X10*3/UL (ref 4.4–11.3)

## 2025-04-30 PROCEDURE — 85652 RBC SED RATE AUTOMATED: CPT

## 2025-04-30 PROCEDURE — 85025 COMPLETE CBC W/AUTO DIFF WBC: CPT

## 2025-04-30 PROCEDURE — 96365 THER/PROPH/DIAG IV INF INIT: CPT | Performed by: NURSE PRACTITIONER

## 2025-04-30 PROCEDURE — 82306 VITAMIN D 25 HYDROXY: CPT

## 2025-04-30 PROCEDURE — 86140 C-REACTIVE PROTEIN: CPT

## 2025-04-30 PROCEDURE — 80053 COMPREHEN METABOLIC PANEL: CPT

## 2025-04-30 PROCEDURE — 81025 URINE PREGNANCY TEST: CPT | Performed by: NURSE PRACTITIONER

## 2025-04-30 RX ORDER — FAMOTIDINE 10 MG/ML
20 INJECTION, SOLUTION INTRAVENOUS ONCE AS NEEDED
OUTPATIENT
Start: 2025-05-28

## 2025-04-30 RX ORDER — DIPHENHYDRAMINE HYDROCHLORIDE 50 MG/ML
50 INJECTION, SOLUTION INTRAMUSCULAR; INTRAVENOUS AS NEEDED
OUTPATIENT
Start: 2025-05-28

## 2025-04-30 RX ORDER — ALBUTEROL SULFATE 0.83 MG/ML
3 SOLUTION RESPIRATORY (INHALATION) AS NEEDED
OUTPATIENT
Start: 2025-05-28

## 2025-04-30 RX ORDER — EPINEPHRINE 0.3 MG/.3ML
0.3 INJECTION SUBCUTANEOUS EVERY 5 MIN PRN
OUTPATIENT
Start: 2025-05-28

## 2025-04-30 ASSESSMENT — ENCOUNTER SYMPTOMS
ABDOMINAL PAIN: 0
VOICE CHANGE: 0
NUMBNESS: 0
BRUISES/BLEEDS EASILY: 0
FEVER: 0
VOMITING: 0
HEMATURIA: 0
CONSTIPATION: 0
SHORTNESS OF BREATH: 0
CHILLS: 0
NAUSEA: 0
WHEEZING: 0
WOUND: 0
LEG SWELLING: 0
TROUBLE SWALLOWING: 0
ARTHRALGIAS: 0
COUGH: 0
LIGHT-HEADEDNESS: 0
EYE PROBLEMS: 0
DYSURIA: 0
BLOOD IN STOOL: 0
APPETITE CHANGE: 0
EXTREMITY WEAKNESS: 0
SORE THROAT: 0
PALPITATIONS: 0
MYALGIAS: 0
DIZZINESS: 0
FATIGUE: 0
UNEXPECTED WEIGHT CHANGE: 0
DIARRHEA: 0
FREQUENCY: 0
HEADACHES: 0

## 2025-04-30 ASSESSMENT — PAIN SCALES - GENERAL: PAINLEVEL_OUTOF10: 0-NO PAIN

## 2025-04-30 NOTE — PROGRESS NOTES
[facility]   Infusion Clinic Note   Date: 2025   Name: Bria Muniz  : 1985   MRN: [id number]         Reason for Visit: OP Infusion (Actemra 200mg every 28 days)         Today: We administered tocilizumab (Actemra) 200 mg in sodium chloride 0.9% 100 mL IV.       Ordered By: Dr Hughes      For a Diagnosis of: Seropositive rheumatoid arthritis of multiple sites (Multi)       At today's visit patient accompanied by: Self      Today's Vitals:   Vitals:    25 1017 25 1208   BP: 107/73 104/71   Pulse: 73 70   Resp: 18 16   Temp: 36.8 °C (98.3 °F) 36.9 °C (98.5 °F)   SpO2: 99% 100%   Weight: 49.9 kg (110 lb)    PainSc: 0-No pain    LMP: 2025             Pre - Treatment Checklist:      - Previous reaction to current treatment: no      (Assess patient for the concerns below. Document provider notification as appropriate).  - Active or recent infection with/without current antibiotic use: no  - Recent or planned invasive dental work: no  - Recent or planned surgeries: no  - Recently received or plans to receive vaccinations: no  - Has treatment related toxicities: no  - Any chance may be pregnant:  no POC HCG NEG       Pain: 0   - Is the pain different from normal: no   - Is prescribing Doctor aware:  n/a      Labs: Labs drawn and sent per order      Fall Risk Screening: Cantor Fall Risk  History of Falling, Immediate or Within 3 Months: No  Secondary Diagnosis: No  Ambulatory Aid: Walks without aid/bedrest/nurse assist  Intravenous Therapy/Heparin Lock: Yes  Gait/Transferring: Normal/bedrest/immobile  Mental Status: Oriented to own ability  Cantor Fall Risk Score: 20       Review Of Systems:  Review of Systems   Constitutional:  Negative for appetite change, chills, fatigue, fever and unexpected weight change.   HENT:   Negative for hearing loss, mouth sores, sore throat, tinnitus, trouble swallowing and voice change.    Eyes:  Negative for eye problems.  "  Respiratory:  Negative for cough, shortness of breath and wheezing.    Cardiovascular:  Negative for chest pain, leg swelling and palpitations.   Gastrointestinal:  Negative for abdominal pain, blood in stool, constipation, diarrhea, nausea and vomiting.   Genitourinary:  Negative for dysuria, frequency and hematuria.    Musculoskeletal:  Negative for arthralgias and myalgias.   Skin:  Negative for itching, rash and wound.   Neurological:  Negative for dizziness, extremity weakness, headaches, light-headedness and numbness.   Hematological:  Does not bruise/bleed easily.         Infusion Readiness:  - Assessment Concerns Related to Infusion: No  - Provider notified: n/a      New Patient Education:    N/A (returning patient for continuation of therapy. Ongoing education provided as needed.)        Treatment Conditions & Drug Specific Questions:    Tocilizumab  (ACTEMRA, TYENNE, TOFIDENCE)   (Unless otherwise specified on patient specific therapy plan):     TREATMENT CONDITIONS:  Unless otherwise specified on patient specific therapy plan HOLD and notify provider prior to proceeding with treatment if:   o Platelets LESS than 100 x 10E9L  o ANC LESS than 2 x 10E9/L  o ALT GREATER than 1.5x ULN and/or AST GREATER than 1.5x ULN  o Positive T-spot  o Reactive Hepatitis B Surface Antigen  -           Positive Pregnancy    Lab Results   Component Value Date    WBC 7.1 02/05/2025    HGB 13.5 02/05/2025    HCT 40.5 02/05/2025     (H) 02/05/2025     02/05/2025     No results found for: \"PR1\", \"CBCDIF\", \"CBCDFNOTE\"  No results found for: \"PR1\", \"CBCDIF\", \"CBCDFNOTE\", \"SLIDCBCRST\"     Lab Results   Component Value Date    NEUTROABS 4.53 02/05/2025     Lab Results   Component Value Date    ABSNEUCAL 2.38 05/17/2023       Lab Results   Component Value Date    ABSNEUCAL 2.38 05/17/2023       Lab Results   Component Value Date    TBSIN Negative 10/02/2024    TBGRES Negative  Reference range: NEGATIVE   02/07/2022    " TSPOTR  05/17/2023     Negative  Reference range: Normal Value: Negative    A negative test result does not exclude the possibility of exposure  to   or infection with Mycobacterium tuberculosis (M. tuberculosis).    Patients with recent exposure to TB infected individuals exhibiting a   negative T-SPOT.TB result should be considered for retesting within 6   weeks or if other relevant clinical symptoms indicate.  Results from   T-SPOT.TB testing must be used in conjunction with each individual's   epidemiological history, current medical status, and results of other   diagnostic evaluations.  The T-SPOT.TB test is qualitative and  results   are reported as positive, borderline or negative, given that the test   controls perform as expected. In line with the Centers for Disease   Control and Prevention's 2010 recommendation to report quantitative   measurements alongside the qualitative result, the laboratory  provides   spot counts for informational purposes only.  The T-SPOT.TB test  should   not be interpreted as a quantitative test.  Test performed at:  [facility] [address] [facility] [address]        Lab Results   Component Value Date    TBSIN Negative 10/02/2024    TSPOTR  05/17/2023     Negative  Reference range: Normal Value: Negative    A negative test result does not exclude the possibility of exposure  to   or infection with Mycobacterium tuberculosis (M. tuberculosis).    Patients with recent exposure to TB infected individuals exhibiting a   negative T-SPOT.TB result should be considered for retesting within 6   weeks or if other relevant clinical symptoms indicate.  Results from   T-SPOT.TB testing must be used in conjunction with each individual's   epidemiological history, current medical status, and results of other   diagnostic evaluations.  The T-SPOT.TB test is qualitative and  results   are reported as positive, borderline or negative, given that the test   controls perform as expected. In  line with the Centers for Disease   Control and Prevention's 2010 recommendation to report quantitative   measurements alongside the qualitative result, the laboratory  provides   spot counts for informational purposes only.  The T-SPOT.TB test  should   not be interpreted as a quantitative test.  [facility] at:  [facility] [address]         Lab Results   Component Value Date    TBSIN Negative 10/02/2024    TSPOTR  05/17/2023     Negative  Reference range: Normal Value: Negative    A negative test result does not exclude the possibility of exposure  to   or infection with Mycobacterium tuberculosis (M. tuberculosis).    Patients with recent exposure to TB infected individuals exhibiting a   negative T-SPOT.TB result should be considered for retesting within 6   weeks or if other relevant clinical symptoms indicate.  Results from   T-SPOT.TB testing must be used in conjunction with each individual's   epidemiological history, current medical status, and results of other   diagnostic evaluations.  The T-SPOT.TB test is qualitative and  results   are reported as positive, borderline or negative, given that the test   controls perform as expected. In line with the Centers for Disease   Control and Prevention's 2010 recommendation to report quantitative   measurements alongside the qualitative result, the laboratory  provides   spot counts for informational purposes only.  The T-SPOT.TB test  should   not be interpreted as a quantitative test.  Test performed at:  [facility] [address] [facility] [address]         Lab Results   Component Value Date    HEPBSAG Nonreactive 10/02/2024      Lab Results   Component Value Date    HEPBSAG Nonreactive 10/02/2024        Patient meets treatment conditions? Yes    DRUG SPECIFIC QUESTIONS:  Any symptoms of (abdominal pain, distention, uncontrolled n/v) or new diagnosis of GI perforation? No  (If YES notify prescribing provider prior to proceeding.  Actemra may increase risk of bowel perforation)      REMINDERS:  - PREGNANCY CATEGORY X DRUG. OBTAIN NEGTATIVE PREGNANCY TEST PRIOR TO FIRST INFUSION FOR    WOMEN OF CHILDBEARING ABILITY   - WEIGHT BASED DRUG     Recommended Vitals/Observation:  Vitals: Monitor patient's vital signs prior to infusion start, every 30 minutes during infusion and 30 minutes after infusion.   Observation: Patient may leave 30 minutes post infusion. Observation complete.          Weight Based Drug Calculations:    WEIGHT BASED DRUGS: Tocilizumab (ACTEMRA)   Patient's dosing weight (kg): 50     10% weight variance for prescribed treatment: 45 kg to 55 kg     Patient's weight today:   Vitals:    04/30/25 1017   Weight: 49.9 kg (110 lb)         weight range for prescribed dose:     Patient weight today falls outside of 10% variance or  weight range: No     Home Care pharmacist informed of weight variance: Not applicable    Doses that are weight based have an acceptable variance rule within 10% of the prescribed   order and/or within  weight range. If patient weight on day of infusion falls   outside of the 10% variance, or weight range, infusion is administered and   pharmacy contacted regarding future dosing adjustments, per policy.      Post Treatment: Patient tolerated treatment without issue and was discharged in no apparent distress.      Note Authored / Patient Cared for By: Tiffany Jarvis RN  _________________________________________________________________________    Note authored and patient cared for by: Tiffany Jarvis RN   Note/Encounter reviewed by: Calista AVILEZ NP. This provider on site at time of patient infusion. Infusion staff to notify this provider of any questions, concerns, abnormals or issues during infusion.    Final check of medication completed by this [facility] / or on-site pharmacist with administering nurse using positive identification prior to administration. Final appearance  of product checked for accuracy and conformity to the formula of the prepared product. Assured use of correct ingredients, accurate calculations and precise measurements under appropriate conditions and procedures.    No issues reported during today's encounter. Pt. tolerated infusion without difficulty. Pt. not independently evaluated by this provider during today's encounter.  (Calista AVILEZ NP)

## 2025-04-30 NOTE — PATIENT INSTRUCTIONS
Today :We administered tocilizumab (Actemra) 200 mg in sodium chloride 0.9% 100 mL IV.     For:   1. Seropositive rheumatoid arthritis of multiple sites (Multi)         Your next appointment is due in:  28 days        Please read the  Medication Guide that was given to you and reviewed during todays visit.     (Tell all doctors including dentists that you are taking this medication)     Go to the emergency room or call 911 if:  -You have signs of allergic reaction:   -Rash, hives, itching.   -Swollen, blistered, peeling skin.   -Swelling of face, lips, mouth, tongue or throat.   -Tightness of chest, trouble breathing, swallowing or talking     Call your doctor:  - If IV / injection site gets red, warm, swollen, itchy or leaks fluid or pus.     (Leave dressing on your IV site for at least 2 hours and keep area clean and dry  - If you get sick or have symptoms of infection or are not feeling well for any reason.    (Wash your hands often, stay away from people who are sick)  - If you have side effects from your medication that do not go away or are bothersome.     (Refer to the teaching your nurse gave you for side effects to call your doctor about)    - Common side effects may include:  stuffy nose, headache, feeling tired, muscle aches, upset stomach  - Before receiving any vaccines     - Call [facility] at   If:  - You get sick, are on antibiotics, have had a recent vaccine, have surgery or dental work and your doctor wants your visit rescheduled.  - You need to cancel and reschedule your visit for any reason. Call at least 2 days before your visit if you need to cancel.   - Your insurance changes before your next visit.    (We will need to get approval from your new insurance. This can take up to two weeks.)     [facility] is opened Monday thru Friday. We are closed on weekends and holidays.   Voice mail will take your call if the center is closed. If you leave a message  please allow 24 hours for a call back during weekdays. If you leave a message on a weekend/holiday, we will call you back the next business day.    A pharmacist is available Monday - Friday from 8:30AM to 3:30PM to help answer any questions you may have about your prescriptions(s). Please call pharmacy at:    [facility]: [phone]  [facility]: [phone]  [facility]: [phone]

## 2025-05-28 ENCOUNTER — APPOINTMENT (OUTPATIENT)
Dept: INFUSION THERAPY | Facility: CLINIC | Age: 40
End: 2025-05-28
Payer: COMMERCIAL

## 2025-05-28 VITALS
OXYGEN SATURATION: 100 % | DIASTOLIC BLOOD PRESSURE: 69 MMHG | WEIGHT: 106.3 LBS | TEMPERATURE: 98.6 F | RESPIRATION RATE: 18 BRPM | SYSTOLIC BLOOD PRESSURE: 101 MMHG | BODY MASS INDEX: 21.47 KG/M2 | HEART RATE: 69 BPM

## 2025-05-28 DIAGNOSIS — M05.79 SEROPOSITIVE RHEUMATOID ARTHRITIS OF MULTIPLE SITES (MULTI): ICD-10-CM

## 2025-05-28 LAB — PREGNANCY TEST URINE, POC: NEGATIVE

## 2025-05-28 PROCEDURE — 81025 URINE PREGNANCY TEST: CPT | Performed by: NURSE PRACTITIONER

## 2025-05-28 PROCEDURE — 96365 THER/PROPH/DIAG IV INF INIT: CPT | Performed by: NURSE PRACTITIONER

## 2025-05-28 RX ORDER — ALBUTEROL SULFATE 0.83 MG/ML
3 SOLUTION RESPIRATORY (INHALATION) AS NEEDED
Status: DISCONTINUED | OUTPATIENT
Start: 2025-05-28 | End: 2025-05-28 | Stop reason: HOSPADM

## 2025-05-28 RX ORDER — EPINEPHRINE 0.3 MG/.3ML
0.3 INJECTION SUBCUTANEOUS EVERY 5 MIN PRN
OUTPATIENT
Start: 2025-06-25

## 2025-05-28 RX ORDER — DIPHENHYDRAMINE HYDROCHLORIDE 50 MG/ML
50 INJECTION, SOLUTION INTRAMUSCULAR; INTRAVENOUS AS NEEDED
Status: DISCONTINUED | OUTPATIENT
Start: 2025-05-28 | End: 2025-05-28 | Stop reason: HOSPADM

## 2025-05-28 RX ORDER — EPINEPHRINE 0.3 MG/.3ML
0.3 INJECTION SUBCUTANEOUS EVERY 5 MIN PRN
Status: DISCONTINUED | OUTPATIENT
Start: 2025-05-28 | End: 2025-05-28 | Stop reason: HOSPADM

## 2025-05-28 RX ORDER — DIPHENHYDRAMINE HYDROCHLORIDE 50 MG/ML
50 INJECTION, SOLUTION INTRAMUSCULAR; INTRAVENOUS AS NEEDED
OUTPATIENT
Start: 2025-06-25

## 2025-05-28 RX ORDER — FAMOTIDINE 10 MG/ML
20 INJECTION, SOLUTION INTRAVENOUS ONCE AS NEEDED
Status: DISCONTINUED | OUTPATIENT
Start: 2025-05-28 | End: 2025-05-28 | Stop reason: HOSPADM

## 2025-05-28 RX ORDER — FAMOTIDINE 10 MG/ML
20 INJECTION, SOLUTION INTRAVENOUS ONCE AS NEEDED
OUTPATIENT
Start: 2025-06-25

## 2025-05-28 RX ORDER — ALBUTEROL SULFATE 0.83 MG/ML
3 SOLUTION RESPIRATORY (INHALATION) AS NEEDED
OUTPATIENT
Start: 2025-06-25

## 2025-05-28 ASSESSMENT — ENCOUNTER SYMPTOMS
HEADACHES: 0
UNEXPECTED WEIGHT CHANGE: 0
WOUND: 0
BRUISES/BLEEDS EASILY: 0
PALPITATIONS: 0
HEMATURIA: 0
FEVER: 0
DYSURIA: 0
SHORTNESS OF BREATH: 0
MYALGIAS: 0
EYE PROBLEMS: 0
CONSTIPATION: 0
ABDOMINAL PAIN: 0
EXTREMITY WEAKNESS: 0
LEG SWELLING: 0
DIZZINESS: 0
VOMITING: 0
LIGHT-HEADEDNESS: 0
NAUSEA: 0
ARTHRALGIAS: 0
WHEEZING: 0
FREQUENCY: 0
CHILLS: 0
COUGH: 0
DIARRHEA: 0
FATIGUE: 0
APPETITE CHANGE: 0

## 2025-05-28 ASSESSMENT — PAIN SCALES - GENERAL: PAINLEVEL_OUTOF10: 0-NO PAIN

## 2025-05-28 NOTE — PATIENT INSTRUCTIONS
Today :We administered tocilizumab (Actemra) 200 mg in sodium chloride 0.9% 100 mL IV.     For:   1. Seropositive rheumatoid arthritis of multiple sites (Multi)         Your next appointment is due in:  6/25/2025        Please read the  Medication Guide that was given to you and reviewed during todays visit.     (Tell all doctors including dentists that you are taking this medication)     Go to the emergency room or call 911 if:  -You have signs of allergic reaction:   -Rash, hives, itching.   -Swollen, blistered, peeling skin.   -Swelling of face, lips, mouth, tongue or throat.   -Tightness of chest, trouble breathing, swallowing or talking     Call your doctor:  - If IV / injection site gets red, warm, swollen, itchy or leaks fluid or pus.     (Leave dressing on your IV site for at least 2 hours and keep area clean and dry  - If you get sick or have symptoms of infection or are not feeling well for any reason.    (Wash your hands often, stay away from people who are sick)  - If you have side effects from your medication that do not go away or are bothersome.     (Refer to the teaching your nurse gave you for side effects to call your doctor about)    - Common side effects may include:  stuffy nose, headache, feeling tired, muscle aches, upset stomach  - Before receiving any vaccines     - Call the [facility] at   If:  - You get sick, are on antibiotics, have had a recent vaccine, have surgery or dental work and your doctor wants your visit rescheduled.  - You need to cancel and reschedule your visit for any reason. Call at least 2 days before your visit if you need to cancel.   - Your insurance changes before your next visit.    (We will need to get approval from your new insurance. This can take up to two weeks.)     [facility] is opened Monday thru Friday. We are closed on weekends and holidays.   Voice mail will take your call if the center is closed. If you leave a message  please allow 24 hours for a call back during weekdays. If you leave a message on a weekend/holiday, we will call you back the next business day.    A pharmacist is available Monday - Friday from 8:30AM to 3:30PM to help answer any questions you may have about your prescriptions(s). Please call pharmacy at:    [facility]: [phone]  [facility]: [phone]  [facility]: [phone]

## 2025-05-28 NOTE — PROGRESS NOTES
[facility]   Infusion Clinic Note   Date: May 28, 2025   Name: Bria Muniz  : 1985   MRN: [id number]         Reason for Visit: OP Infusion (Actemra 200 mg every 28 days)         Today: We administered tocilizumab (Actemra) 200 mg in sodium chloride 0.9% 100 mL IV.       Ordered By: MARJ Hughes M.D.      For a Diagnosis of: Seropositive rheumatoid arthritis of multiple sites (Multi)       At today's visit patient accompanied by: Self      Today's Vitals:   Vitals:    25 1012 25 1151 25 1241   BP: 111/70 104/72 101/69   Pulse: 75 67 69   Resp: 18 18 18   Temp: 36.8 °C (98.2 °F) 36.9 °C (98.5 °F) 37 °C (98.6 °F)   SpO2: 100% 100% 100%   Weight: 48.2 kg (106 lb 4.8 oz)     PainSc: 0-No pain     LMP: 2025             Pre - Treatment Checklist:      - Previous reaction to current treatment: yes      (Assess patient for the concerns below. Document provider notification as appropriate).  - Active or recent infection with/without current antibiotic use: no  - Recent or planned invasive dental work: no  - Recent or planned surgeries: no  - Recently received or plans to receive vaccinations: no  - Has treatment related toxicities: no  - Any chance may be pregnant:  no, Urine HCG negative today      Pain: 0   - Is the pain different from normal: no   - Is prescribing Doctor aware:  no      Labs: Reviewed       Fall Risk Screening: Cantor Fall Risk  History of Falling, Immediate or Within 3 Months: No  Secondary Diagnosis: Yes  Ambulatory Aid: Walks without aid/bedrest/nurse assist  Intravenous Therapy/Heparin Lock: Yes  Gait/Transferring: Normal/bedrest/immobile  Mental Status: Oriented to own ability  Cantor Fall Risk Score: 35       Review Of Systems:  Review of Systems   Constitutional:  Negative for appetite change, chills, fatigue, fever and unexpected weight change.   HENT:   Negative for hearing loss and mouth sores.    Eyes:  Negative for eye problems.  "  Respiratory:  Negative for cough, shortness of breath and wheezing.    Cardiovascular:  Negative for chest pain, leg swelling and palpitations.   Gastrointestinal:  Negative for abdominal pain, constipation, diarrhea, nausea and vomiting.   Genitourinary:  Negative for dysuria, frequency and hematuria.    Musculoskeletal:  Negative for arthralgias, gait problem and myalgias.   Skin:  Negative for itching, rash and wound.   Neurological:  Negative for dizziness, extremity weakness, gait problem, headaches and light-headedness.   Hematological:  Does not bruise/bleed easily.         Infusion Readiness:  - Assessment Concerns Related to Infusion: No  - Provider notified: no      New Patient Education:    N/A (returning patient for continuation of therapy. Ongoing education provided as needed.)        Treatment Conditions & Drug Specific Questions:    Tocilizumab  (ACTEMRA, TYENNE, TOFIDENCE)   (Unless otherwise specified on patient specific therapy plan):     TREATMENT CONDITIONS:  Unless otherwise specified on patient specific therapy plan HOLD and notify provider prior to proceeding with treatment if:   o Platelets LESS than 100 x 10E9L  o ANC LESS than 2 x 10E9/L  o ALT GREATER than 1.5x ULN and/or AST GREATER than 1.5x ULN  o Positive T-spot  o Reactive Hepatitis B Surface Antigen  -           Positive Pregnancy    Lab Results   Component Value Date    WBC 4.7 04/30/2025    HGB 13.4 04/30/2025    HCT 40.2 04/30/2025     (H) 04/30/2025     04/30/2025     No results found for: \"PR1\", \"CBCDIF\", \"CBCDFNOTE\"  No results found for: \"PR1\", \"CBCDIF\", \"CBCDFNOTE\", \"SLIDCBCRST\"     Lab Results   Component Value Date    NEUTROABS 2.60 04/30/2025     Lab Results   Component Value Date    ABSNEUCAL 2.38 05/17/2023       Lab Results   Component Value Date    ABSNEUCAL 2.38 05/17/2023       Lab Results   Component Value Date    TBSIN Negative 10/02/2024    TBGRES Negative  Reference range: NEGATIVE   02/07/2022    " TSPOTR  05/17/2023     Negative  Reference range: Normal Value: Negative    A negative test result does not exclude the possibility of exposure  to   or infection with Mycobacterium tuberculosis (M. tuberculosis).    Patients with recent exposure to TB infected individuals exhibiting a   negative T-SPOT.TB result should be considered for retesting within 6   weeks or if other relevant clinical symptoms indicate.  Results from   T-SPOT.TB testing must be used in conjunction with each individual's   epidemiological history, current medical status, and results of other   diagnostic evaluations.  The T-SPOT.TB test is qualitative and  results   are reported as positive, borderline or negative, given that the test   controls perform as expected. In line with the Centers for Disease   Control and Prevention's 2010 recommendation to report quantitative   measurements alongside the qualitative result, the laboratory  provides   spot counts for informational purposes only.  The T-SPOT.TB test  should   not be interpreted as a quantitative test.  Test performed at:  [facility] [address] [facility] [address]        Lab Results   Component Value Date    TBSIN Negative 10/02/2024    TSPOTR  05/17/2023     Negative  Reference range: Normal Value: Negative    A negative test result does not exclude the possibility of exposure  to   or infection with Mycobacterium tuberculosis (M. tuberculosis).    Patients with recent exposure to TB infected individuals exhibiting a   negative T-SPOT.TB result should be considered for retesting within 6   weeks or if other relevant clinical symptoms indicate.  Results from   T-SPOT.TB testing must be used in conjunction with each individual's   epidemiological history, current medical status, and results of other   diagnostic evaluations.  The T-SPOT.TB test is qualitative and  results   are reported as positive, borderline or negative, given that the test   controls perform as expected. In  line with the Centers for Disease   Control and Prevention's 2010 recommendation to report quantitative   measurements alongside the qualitative result, the laboratory  provides   spot counts for informational purposes only.  The T-SPOT.TB test  should   not be interpreted as a quantitative test.  Test performed at:  [place] [facility] [address]         Lab Results   Component Value Date    TBSIN Negative 10/02/2024    TSPOTR  05/17/2023     Negative  Reference range: Normal Value: Negative    A negative test result does not exclude the possibility of exposure  to   or infection with Mycobacterium tuberculosis (M. tuberculosis).    Patients with recent exposure to TB infected individuals exhibiting a   negative T-SPOT.TB result should be considered for retesting within 6   weeks or if other relevant clinical symptoms indicate.  Results from   T-SPOT.TB testing must be used in conjunction with each individual's   epidemiological history, current medical status, and results of other   diagnostic evaluations.  The T-SPOT.TB test is qualitative and  results   are reported as positive, borderline or negative, given that the test   controls perform as expected. In line with the Centers for Disease   Control and Prevention's 2010 recommendation to report quantitative   measurements alongside the qualitative result, the laboratory  provides   spot counts for informational purposes only.  The T-SPOT.TB test  should   not be interpreted as a quantitative test.  Test performed at:  [place] [address] [facility] [address]         Lab Results   Component Value Date    HEPBSAG Nonreactive 10/02/2024      Lab Results   Component Value Date    HEPBSAG Nonreactive 10/02/2024        Patient meets treatment conditions? Yes    DRUG SPECIFIC QUESTIONS:  Any symptoms of (abdominal pain, distention, uncontrolled n/v) or new diagnosis of GI perforation? No  (If YES notify prescribing provider prior to proceeding.  Actemra may increase risk of bowel perforation)      REMINDERS:  - PREGNANCY CATEGORY X DRUG. OBTAIN NEGTATIVE PREGNANCY TEST PRIOR TO FIRST INFUSION FOR    WOMEN OF CHILDBEARING ABILITY   - WEIGHT BASED DRUG     Recommended Vitals/Observation:  Vitals: Monitor patient's vital signs prior to infusion start, every 30 minutes during infusion and 30 minutes after infusion.   Observation: Patient may leave 30 minutes post infusion. Patient declined to stay for observation. Educated on signs and symptoms to monitor for and when to present to the ED         Weight Based Drug Calculations:    WEIGHT BASED DRUGS: Tocilizumab (ACTEMRA)   Patient's dosing weight (kg): 50     10% weight variance for prescribed treatment: 45 kg to 55 kg     Patient's weight today:   Vitals:    05/28/25 1012   Weight: 48.2 kg (106 lb 4.8 oz)         weight range for prescribed dose:     Patient weight today falls outside of 10% variance or  weight range: No     Home Care pharmacist informed of weight variance: No    Doses that are weight based have an acceptable variance rule within 10% of the prescribed   order and/or within  weight range. If patient weight on day of infusion falls   outside of the 10% variance, or weight range, infusion is administered and   pharmacy contacted regarding future dosing adjustments, per policy.      Post Treatment: Patient tolerated treatment without issue and was discharged in no apparent distress.  RTC on 6/25/2025.      Note Authored / Patient Cared for By: Kenia Arriaga RN   _________________________________________________________________________    Note authored and patient cared for by: Kenia Arriaga RN   Note/Encounter reviewed by: Calista AVILEZ NP. This provider on site at time of patient infusion. Infusion staff to notify this provider of any questions, concerns, abnormals or issues during infusion.    Final check of medication completed by this FATMATA /  or on-site pharmacist with administering nurse using positive identification prior to administration. Final appearance of product checked for accuracy and conformity to the formula of the prepared product. Assured use of correct ingredients, accurate calculations and precise measurements under appropriate conditions and procedures.    No issues reported during today's encounter. Pt. tolerated infusion without difficulty. Pt. not independently evaluated by this provider during today's encounter.  (Calista AVILEZ NP)

## 2025-06-25 ENCOUNTER — APPOINTMENT (OUTPATIENT)
Dept: INFUSION THERAPY | Facility: CLINIC | Age: 40
End: 2025-06-25
Payer: COMMERCIAL

## 2025-06-25 VITALS
SYSTOLIC BLOOD PRESSURE: 100 MMHG | DIASTOLIC BLOOD PRESSURE: 55 MMHG | TEMPERATURE: 98 F | BODY MASS INDEX: 21.45 KG/M2 | HEART RATE: 84 BPM | OXYGEN SATURATION: 100 % | WEIGHT: 106.2 LBS | RESPIRATION RATE: 18 BRPM

## 2025-06-25 DIAGNOSIS — M05.79 SEROPOSITIVE RHEUMATOID ARTHRITIS OF MULTIPLE SITES (MULTI): ICD-10-CM

## 2025-06-25 RX ORDER — ALBUTEROL SULFATE 0.83 MG/ML
3 SOLUTION RESPIRATORY (INHALATION) AS NEEDED
OUTPATIENT
Start: 2025-07-23

## 2025-06-25 RX ORDER — FAMOTIDINE 10 MG/ML
20 INJECTION, SOLUTION INTRAVENOUS ONCE AS NEEDED
OUTPATIENT
Start: 2025-07-23

## 2025-06-25 RX ORDER — EPINEPHRINE 0.3 MG/.3ML
0.3 INJECTION SUBCUTANEOUS EVERY 5 MIN PRN
OUTPATIENT
Start: 2025-07-23

## 2025-06-25 RX ORDER — DIPHENHYDRAMINE HYDROCHLORIDE 50 MG/ML
50 INJECTION, SOLUTION INTRAMUSCULAR; INTRAVENOUS AS NEEDED
OUTPATIENT
Start: 2025-07-23

## 2025-06-25 ASSESSMENT — ENCOUNTER SYMPTOMS
FREQUENCY: 0
DYSURIA: 0
MYALGIAS: 0
APPETITE CHANGE: 0
NUMBNESS: 0
ARTHRALGIAS: 0
FATIGUE: 0
HEMATURIA: 0
WOUND: 0
SHORTNESS OF BREATH: 0
CONSTIPATION: 0
EYE PROBLEMS: 0
LIGHT-HEADEDNESS: 0
HEADACHES: 0
UNEXPECTED WEIGHT CHANGE: 0
COUGH: 0
BRUISES/BLEEDS EASILY: 0
BLOOD IN STOOL: 0
TROUBLE SWALLOWING: 0
DIARRHEA: 0
SORE THROAT: 0
WHEEZING: 0
VOICE CHANGE: 0
LEG SWELLING: 0
CHILLS: 0
DIZZINESS: 0
ABDOMINAL PAIN: 0
FEVER: 0
VOMITING: 0
NAUSEA: 0
PALPITATIONS: 0
EXTREMITY WEAKNESS: 0

## 2025-06-25 ASSESSMENT — PAIN SCALES - GENERAL: PAINLEVEL_OUTOF10: 0-NO PAIN

## 2025-06-25 NOTE — PATIENT INSTRUCTIONS
"Patient c/o chest pain rates pain at 10/10 oxygen applied 2 liters via nc and 0.4 mg of nitroglycerin administered as per order. 5 minutes later patient stated, "Pain is much better." rates pain at 7/10. Patient sitting up in bed and conversing with  Staff. Will monitor.   " Today :Bria Muniz had no medications administered during this visit.     For:   1. Seropositive rheumatoid arthritis of multiple sites (Multi)         Your next appointment is due in:  28 days       Please read the  Medication Guide that was given to you and reviewed during todays visit.     (Tell all doctors including dentists that you are taking this medication)     Go to the emergency room or call 911 if:  -You have signs of allergic reaction:   -Rash, hives, itching.   -Swollen, blistered, peeling skin.   -Swelling of face, lips, mouth, tongue or throat.   -Tightness of chest, trouble breathing, swallowing or talking     Call your doctor:  - If IV / injection site gets red, warm, swollen, itchy or leaks fluid or pus.     (Leave dressing on your IV site for at least 2 hours and keep area clean and dry  - If you get sick or have symptoms of infection or are not feeling well for any reason.    (Wash your hands often, stay away from people who are sick)  - If you have side effects from your medication that do not go away or are bothersome.     (Refer to the teaching your nurse gave you for side effects to call your doctor about)    - Common side effects may include:  stuffy nose, headache, feeling tired, muscle aches, upset stomach  - Before receiving any vaccines     - [facility] at   If:  - You get sick, are on antibiotics, have had a recent vaccine, have surgery or dental work and your doctor wants your visit rescheduled.  - You need to cancel and reschedule your visit for any reason. Call at least 2 days before your visit if you need to cancel.   - Your insurance changes before your next visit.    (We will need to get approval from your new insurance. This can take up to two weeks.)     [facility] is opened Monday thru Friday. We are closed on weekends and holidays.   Voice mail will take your call if the center is closed. If you leave a message please allow  24 hours for a call back during weekdays. If you leave a message on a weekend/holiday, we will call you back the next business day.    A pharmacist is available Monday - Friday from 8:30AM to 3:30PM to help answer any questions you may have about your prescriptions(s). Please call pharmacy at:    [facility]: [phone]  [facility]: [phone]  [facility]: [phone]

## 2025-06-25 NOTE — PROGRESS NOTES
[facility]   Infusion Clinic Note   Date: 2025   Name: Bria Muniz  : 1985   MRN: [id number]         Reason for Visit: OP Infusion (Actemra)         Today: We administered tocilizumab (Actemra) 200 mg in sodium chloride 0.9% 100 mL IV.       Ordered By: Dieog Burgos MD       For a Diagnosis of: Seropositive rheumatoid arthritis of multiple sites (Multi)       At today's visit patient accompanied by: Self      Today's Vitals:   Vitals:    25 1007 25 1126 25 1155   BP: 112/68 106/64 100/55   Pulse: 79 85 84   Resp: 18 18 18   Temp: 36.7 °C (98.1 °F) 36.8 °C (98.2 °F) 36.7 °C (98 °F)   SpO2: 100% 100% 100%   Weight: 48.2 kg (106 lb 3.2 oz)     PainSc: 0-No pain     LMP: 2025             Pre - Treatment Checklist:      - Previous reaction to current treatment: no      (Assess patient for the concerns below. Document provider notification as appropriate).  - Active or recent infection with/without current antibiotic use: no  - Recent or planned invasive dental work: no  - Recent or planned surgeries: no  - Recently received or plans to receive vaccinations: no  - Has treatment related toxicities: no  - Any chance may be pregnant:  no      Pain: 0   - Is the pain different from normal: n/a   - Is prescribing Doctor aware:  n/a      Labs: Reviewed       Fall Risk Screening: Cantor Fall Risk  History of Falling, Immediate or Within 3 Months: No  Secondary Diagnosis: Yes  Ambulatory Aid: Walks without aid/bedrest/nurse assist  Intravenous Therapy/Heparin Lock: Yes  Gait/Transferring: Normal/bedrest/immobile  Mental Status: Oriented to own ability  Cantor Fall Risk Score: 35       Review Of Systems:  Review of Systems   Constitutional:  Negative for appetite change, chills, fatigue, fever and unexpected weight change.   HENT:   Negative for hearing loss, mouth sores, sore throat, tinnitus, trouble swallowing and voice change.    Eyes:  Negative for eye  problems.   Respiratory:  Negative for cough, shortness of breath and wheezing.    Cardiovascular:  Negative for chest pain, leg swelling and palpitations.   Gastrointestinal:  Negative for abdominal pain, blood in stool, constipation, diarrhea, nausea and vomiting.   Genitourinary:  Negative for dysuria, frequency and hematuria.    Musculoskeletal:  Negative for arthralgias and myalgias.   Skin:  Negative for itching, rash and wound.   Neurological:  Negative for dizziness, extremity weakness, headaches, light-headedness and numbness.   Hematological:  Does not bruise/bleed easily.         Infusion Readiness:  - Assessment Concerns Related to Infusion: No  - Provider notified: no      New Patient Education:    N/A (returning patient for continuation of therapy. Ongoing education provided as needed.)        Treatment Conditions & Drug Specific Questions:    Tocilizumab  (ACTEMRA, TYENNE, TOFIDENCE)   (Unless otherwise specified on patient specific therapy plan):     TREATMENT CONDITIONS:  Unless otherwise specified on patient specific therapy plan HOLD and notify provider prior to proceeding with treatment if:   o Platelets LESS than 100 x 10E9L  o ANC LESS than 2 x 10E9/L  o ALT GREATER than 1.5x ULN and/or AST GREATER than 1.5x ULN  o Positive T-spot  o Reactive Hepatitis B Surface Antigen  -           Positive Pregnancy    Lab Results   Component Value Date     04/30/2025    NEUTROABS 2.60 04/30/2025    ALT 20 04/30/2025    AST 28 04/30/2025    TBSIN Negative 10/02/2024    HEPBSAG Nonreactive 10/02/2024        Patient meets treatment conditions? Yes    DRUG SPECIFIC QUESTIONS:  Any symptoms of worsening abdominal pain, distention, uncontrolled n/v or new diagnosis of GI perforation? No  (If YES notify prescribing provider prior to proceeding. Actemra may increase risk of bowel perforation)      REMINDERS:  - PREGNANCY CATEGORY X DRUG. OBTAIN NEGTATIVE PREGNANCY TEST PRIOR TO FIRST INFUSION FOR WOMEN OF  CHILDBEARING ABILITY   - WEIGHT BASED DRUG     Recommended Vitals/Observation:  Vitals: Monitor patient's vital signs prior to infusion start, every 30 minutes during infusion and 30 minutes after infusion.   Observation: Patient may leave 30 minutes post infusion. Observation complete.          Weight Based Drug Calculations:    WEIGHT BASED DRUGS: Tocilizumab (ACTEMRA)   Patient's dosing weight (kg): 50 kg    10% weight variance for prescribed treatment: 45 kg to 55 kg     Patient's weight today:   Vitals:    06/25/25 1007   Weight: 48.2 kg (106 lb 3.2 oz)         weight range for prescribed dose:     Patient weight today falls outside of 10% variance or  weight range: No     Home Care pharmacist informed of weight variance: Not applicable    Doses that are weight based have an acceptable variance rule within 10% of the prescribed   order and/or within  weight range. If patient weight on day of infusion falls   outside of the 10% variance, or weight range, infusion is administered and   pharmacy contacted regarding future dosing adjustments, per policy.      Post Treatment: Patient tolerated treatment without issue and was discharged in no apparent distress.      Note Authored / Patient Cared for By: Teresa Neumann RN

## 2025-07-23 ENCOUNTER — APPOINTMENT (OUTPATIENT)
Dept: INFUSION THERAPY | Facility: CLINIC | Age: 40
End: 2025-07-23
Payer: COMMERCIAL

## 2025-07-23 VITALS
BODY MASS INDEX: 21.79 KG/M2 | SYSTOLIC BLOOD PRESSURE: 108 MMHG | TEMPERATURE: 98.4 F | HEART RATE: 76 BPM | WEIGHT: 107.9 LBS | DIASTOLIC BLOOD PRESSURE: 69 MMHG | RESPIRATION RATE: 18 BRPM | OXYGEN SATURATION: 99 %

## 2025-07-23 DIAGNOSIS — M05.79 SEROPOSITIVE RHEUMATOID ARTHRITIS OF MULTIPLE SITES (MULTI): ICD-10-CM

## 2025-07-23 PROCEDURE — 96365 THER/PROPH/DIAG IV INF INIT: CPT | Performed by: NURSE PRACTITIONER

## 2025-07-23 RX ORDER — ALBUTEROL SULFATE 0.83 MG/ML
3 SOLUTION RESPIRATORY (INHALATION) AS NEEDED
OUTPATIENT
Start: 2025-08-20

## 2025-07-23 RX ORDER — FAMOTIDINE 10 MG/ML
20 INJECTION, SOLUTION INTRAVENOUS ONCE AS NEEDED
OUTPATIENT
Start: 2025-08-20

## 2025-07-23 RX ORDER — DIPHENHYDRAMINE HYDROCHLORIDE 50 MG/ML
50 INJECTION, SOLUTION INTRAMUSCULAR; INTRAVENOUS AS NEEDED
OUTPATIENT
Start: 2025-08-20

## 2025-07-23 RX ORDER — EPINEPHRINE 0.3 MG/.3ML
0.3 INJECTION SUBCUTANEOUS EVERY 5 MIN PRN
OUTPATIENT
Start: 2025-08-20

## 2025-07-23 ASSESSMENT — ENCOUNTER SYMPTOMS
FEVER: 0
CHILLS: 0
EXTREMITY WEAKNESS: 0
ABDOMINAL PAIN: 0
BLOOD IN STOOL: 0
DIZZINESS: 0
EYE PROBLEMS: 0
LIGHT-HEADEDNESS: 0
COUGH: 0
DIARRHEA: 0
CONSTIPATION: 0
FATIGUE: 0
HEMATURIA: 0
SORE THROAT: 0
VOMITING: 0
UNEXPECTED WEIGHT CHANGE: 0
MYALGIAS: 0
TROUBLE SWALLOWING: 0
PALPITATIONS: 0
BRUISES/BLEEDS EASILY: 0
NAUSEA: 0
ARTHRALGIAS: 0
SHORTNESS OF BREATH: 0
APPETITE CHANGE: 0
NUMBNESS: 0
HEADACHES: 0
LEG SWELLING: 0
FREQUENCY: 0
DYSURIA: 0
VOICE CHANGE: 0
WHEEZING: 0
WOUND: 0

## 2025-07-23 ASSESSMENT — PAIN SCALES - GENERAL
PAINLEVEL_OUTOF10: 0-NO PAIN
PAINLEVEL_OUTOF10: 0-NO PAIN

## 2025-07-23 NOTE — PATIENT INSTRUCTIONS
Today :We administered tocilizumab (Actemra) 200 mg in sodium chloride 0.9% 100 mL IV.     For:   1. Seropositive rheumatoid arthritis of multiple sites (Multi)         Your next appointment is due in:  28 DAYS          Please read the  Medication Guide that was given to you and reviewed during todays visit.     (Tell all doctors including dentists that you are taking this medication)     Go to the emergency room or call 911 if:  -You have signs of allergic reaction:   -Rash, hives, itching.   -Swollen, blistered, peeling skin.   -Swelling of face, lips, mouth, tongue or throat.   -Tightness of chest, trouble breathing, swallowing or talking     Call your doctor:  - If IV / injection site gets red, warm, swollen, itchy or leaks fluid or pus.     (Leave dressing on your IV site for at least 2 hours and keep area clean and dry  - If you get sick or have symptoms of infection or are not feeling well for any reason.    (Wash your hands often, stay away from people who are sick)  - If you have side effects from your medication that do not go away or are bothersome.     (Refer to the teaching your nurse gave you for side effects to call your doctor about)    - Common side effects may include:  stuffy nose, headache, feeling tired, muscle aches, upset stomach  - Before receiving any vaccines     - Call the [facility] at   If:  - You get sick, are on antibiotics, have had a recent vaccine, have surgery or dental work and your doctor wants your visit rescheduled.  - You need to cancel and reschedule your visit for any reason. Call at least 2 days before your visit if you need to cancel.   - Your insurance changes before your next visit.    (We will need to get approval from your new insurance. This can take up to two weeks.)     [facility] is opened Monday thru Friday. We are closed on weekends and holidays.   Voice mail will take your call if the center is closed. If you leave a message  please allow 24 hours for a call back during weekdays. If you leave a message on a weekend/holiday, we will call you back the next business day.    A pharmacist is available Monday - Friday from 8:30AM to 3:30PM to help answer any questions you may have about your prescriptions(s). Please call pharmacy at:    [facility]: [phone]  [facility]: [phone]  [facility]: [phone]

## 2025-07-23 NOTE — PROGRESS NOTES
[facility]   Infusion Clinic Note   Date: 2025   Name: Bria Muniz  : 1985   MRN: [id number]         Reason for Visit: OP Infusion (200 MG ACTEMRA iNFUSION)         Today: We administered tocilizumab (Actemra) 200 mg in sodium chloride 0.9% 100 mL IV.       Ordered By:  Dr. MARJ Hughes      For a Diagnosis of: Seropositive rheumatoid arthritis of multiple sites (Multi)       At today's visit patient accompanied by: Self      Today's Vitals:   There were no vitals filed for this visit.          Pre - Treatment Checklist:      - Previous reaction to current treatment: no      (Assess patient for the concerns below. Document provider notification as appropriate).  - Active or recent infection with/without current antibiotic use: no  - Recent or planned invasive dental work: no  - Recent or planned surgeries: no  - Recently received or plans to receive vaccinations: no  - Has treatment related toxicities: no  - Any chance may be pregnant:  no      Pain: 0   - Is the pain different from normal: no   - Is prescribing Doctor aware:  n/a      Labs: Reviewed       Fall Risk Screening: Cantor Fall Risk  History of Falling, Immediate or Within 3 Months: No  Secondary Diagnosis: No  Ambulatory Aid: Walks without aid/bedrest/nurse assist  Intravenous Therapy/Heparin Lock: Yes  Gait/Transferring: Normal/bedrest/immobile  Mental Status: Oriented to own ability  Cantor Fall Risk Score: 20            Review Of Systems:  Review of Systems   Constitutional:  Negative for appetite change, chills, fatigue, fever and unexpected weight change.   HENT:   Negative for hearing loss, mouth sores, sore throat, tinnitus, trouble swallowing and voice change.    Eyes:  Negative for eye problems.   Respiratory:  Negative for cough, shortness of breath and wheezing.    Cardiovascular:  Negative for chest pain, leg swelling and palpitations.   Gastrointestinal:  Negative for abdominal pain, blood in stool,  constipation, diarrhea, nausea and vomiting.   Genitourinary:  Negative for dysuria, frequency and hematuria.    Musculoskeletal:  Negative for arthralgias and myalgias.   Skin:  Negative for itching, rash and wound.   Neurological:  Negative for dizziness, extremity weakness, headaches, light-headedness and numbness.   Hematological:  Does not bruise/bleed easily.         Infusion Readiness:  - Assessment Concerns Related to Infusion: No  - Provider notified: n/a      New Patient Education:    N/A (returning patient for continuation of therapy. Ongoing education provided as needed.)        Treatment Conditions & Drug Specific Questions:    Tocilizumab  (ACTEMRA, TYENNE, TOFIDENCE)   (Unless otherwise specified on patient specific therapy plan):     TREATMENT CONDITIONS:  Unless otherwise specified on patient specific therapy plan HOLD and notify provider prior to proceeding with treatment if:   o Platelets LESS than 100 x 10E9L  o ANC LESS than 2 x 10E9/L  o ALT GREATER than 1.5x ULN and/or AST GREATER than 1.5x ULN  o Positive T-spot  o Reactive Hepatitis B Surface Antigen  -           Positive Pregnancy    Lab Results   Component Value Date     04/30/2025    NEUTROABS 2.60 04/30/2025    ALT 20 04/30/2025    AST 28 04/30/2025    TBSIN Negative 10/02/2024    HEPBSAG Nonreactive 10/02/2024        Patient meets treatment conditions? Yes    DRUG SPECIFIC QUESTIONS:  Any symptoms of worsening abdominal pain, distention, uncontrolled n/v or new diagnosis of GI perforation? No  (If YES notify prescribing provider prior to proceeding. Actemra may increase risk of bowel perforation)      REMINDERS:  - PREGNANCY CATEGORY X DRUG. OBTAIN NEGTATIVE PREGNANCY TEST PRIOR TO FIRST INFUSION FOR WOMEN OF CHILDBEARING ABILITY   - WEIGHT BASED DRUG     Recommended Vitals/Observation:  Vitals: Monitor patient's vital signs prior to infusion start, every 30 minutes during infusion and 30 minutes after infusion.   Observation:  Patient may leave 30 minutes post infusion. Patient declined to stay for observation. Educated on signs and symptoms to monitor for and when to present to the ED         Weight Based Drug Calculations:    WEIGHT BASED DRUGS: Tocilizumab (ACTEMRA)   Patient's dosing weight (kg): 50 KG     10% weight variance for prescribed treatment: 45 kg to 55 kg     Patient's weight today:   There were no vitals filed for this visit.      weight range for prescribed dose:     Patient weight today falls outside of 10% variance or  weight range: No     Home Care pharmacist informed of weight variance: Not applicable    Doses that are weight based have an acceptable variance rule within 10% of the prescribed   order and/or within  weight range. If patient weight on day of infusion falls   outside of the 10% variance, or weight range, infusion is administered and   pharmacy contacted regarding future dosing adjustments, per policy.      Post Treatment: Patient tolerated treatment without issue and was discharged in no apparent distress.      Note Authored / Patient Cared for By: Lisa Avila RN   _________________________________________________________________________    Note authored and patient cared for by: Lisa Avila RN  Note/Encounter reviewed by: Calista AVILEZ NP. This provider on site at time of patient infusion. Infusion staff to notify this provider of any questions, concerns, abnormals or issues during infusion.    Final check of medication completed by this [facility] / or on-site pharmacist with administering nurse using positive identification prior to administration. Final appearance of product checked for accuracy and conformity to the formula of the prepared product. Assured use of correct ingredients, accurate calculations and precise measurements under appropriate conditions and procedures.    No issues reported during today's encounter. Pt. tolerated infusion without  difficulty. Pt. not independently evaluated by this provider during today's encounter.  (Calista AVILEZ NP)

## 2025-08-20 ENCOUNTER — APPOINTMENT (OUTPATIENT)
Dept: INFUSION THERAPY | Facility: CLINIC | Age: 40
End: 2025-08-20
Payer: COMMERCIAL

## 2025-08-20 VITALS
OXYGEN SATURATION: 99 % | HEART RATE: 87 BPM | DIASTOLIC BLOOD PRESSURE: 65 MMHG | TEMPERATURE: 98.6 F | WEIGHT: 109.2 LBS | SYSTOLIC BLOOD PRESSURE: 98 MMHG | BODY MASS INDEX: 22.06 KG/M2 | RESPIRATION RATE: 18 BRPM

## 2025-08-20 DIAGNOSIS — M05.79 SEROPOSITIVE RHEUMATOID ARTHRITIS OF MULTIPLE SITES (MULTI): ICD-10-CM

## 2025-08-20 LAB
ALBUMIN SERPL BCP-MCNC: 4.4 G/DL (ref 3.4–5)
ALP SERPL-CCNC: 31 U/L (ref 33–110)
ALT SERPL W P-5'-P-CCNC: 12 U/L (ref 7–45)
ANION GAP SERPL CALCULATED.3IONS-SCNC: 13 MMOL/L (ref 10–20)
AST SERPL W P-5'-P-CCNC: 19 U/L (ref 9–39)
BASOPHILS # BLD AUTO: 0.06 X10*3/UL (ref 0–0.1)
BASOPHILS NFR BLD AUTO: 0.7 %
BILIRUB SERPL-MCNC: 0.7 MG/DL (ref 0–1.2)
BUN SERPL-MCNC: 13 MG/DL (ref 6–23)
CALCIUM SERPL-MCNC: 9.3 MG/DL (ref 8.6–10.3)
CHLORIDE SERPL-SCNC: 106 MMOL/L (ref 98–107)
CO2 SERPL-SCNC: 22 MMOL/L (ref 21–32)
CREAT SERPL-MCNC: 0.72 MG/DL (ref 0.5–1.05)
CRP SERPL-MCNC: 0.11 MG/DL
EGFRCR SERPLBLD CKD-EPI 2021: >90 ML/MIN/1.73M*2
EOSINOPHIL # BLD AUTO: 0.12 X10*3/UL (ref 0–0.7)
EOSINOPHIL NFR BLD AUTO: 1.4 %
ERYTHROCYTE [DISTWIDTH] IN BLOOD BY AUTOMATED COUNT: 12.5 % (ref 11.5–14.5)
ERYTHROCYTE [SEDIMENTATION RATE] IN BLOOD BY WESTERGREN METHOD: <1 MM/H (ref 0–20)
GLUCOSE SERPL-MCNC: 84 MG/DL (ref 74–99)
HCT VFR BLD AUTO: 39.1 % (ref 36–46)
HGB BLD-MCNC: 12.8 G/DL (ref 12–16)
IMM GRANULOCYTES # BLD AUTO: 0.03 X10*3/UL (ref 0–0.7)
IMM GRANULOCYTES NFR BLD AUTO: 0.3 % (ref 0–0.9)
LYMPHOCYTES # BLD AUTO: 1.91 X10*3/UL (ref 1.2–4.8)
LYMPHOCYTES NFR BLD AUTO: 22.1 %
MCH RBC QN AUTO: 33.8 PG (ref 26–34)
MCHC RBC AUTO-ENTMCNC: 32.7 G/DL (ref 32–36)
MCV RBC AUTO: 103 FL (ref 80–100)
MONOCYTES # BLD AUTO: 0.62 X10*3/UL (ref 0.1–1)
MONOCYTES NFR BLD AUTO: 7.2 %
NEUTROPHILS # BLD AUTO: 5.9 X10*3/UL (ref 1.2–7.7)
NEUTROPHILS NFR BLD AUTO: 68.3 %
NRBC BLD-RTO: 0 /100 WBCS (ref 0–0)
PLATELET # BLD AUTO: 305 X10*3/UL (ref 150–450)
POTASSIUM SERPL-SCNC: 4.7 MMOL/L (ref 3.5–5.3)
PROT SERPL-MCNC: 6.7 G/DL (ref 6.4–8.2)
RBC # BLD AUTO: 3.79 X10*6/UL (ref 4–5.2)
SODIUM SERPL-SCNC: 136 MMOL/L (ref 136–145)
WBC # BLD AUTO: 8.6 X10*3/UL (ref 4.4–11.3)

## 2025-08-20 PROCEDURE — 80053 COMPREHEN METABOLIC PANEL: CPT

## 2025-08-20 PROCEDURE — 86140 C-REACTIVE PROTEIN: CPT

## 2025-08-20 PROCEDURE — 96365 THER/PROPH/DIAG IV INF INIT: CPT | Performed by: NURSE PRACTITIONER

## 2025-08-20 PROCEDURE — 85025 COMPLETE CBC W/AUTO DIFF WBC: CPT

## 2025-08-20 PROCEDURE — 85652 RBC SED RATE AUTOMATED: CPT

## 2025-08-20 RX ORDER — FAMOTIDINE 10 MG/ML
20 INJECTION, SOLUTION INTRAVENOUS ONCE AS NEEDED
OUTPATIENT
Start: 2025-09-17

## 2025-08-20 RX ORDER — DIPHENHYDRAMINE HYDROCHLORIDE 50 MG/ML
50 INJECTION, SOLUTION INTRAMUSCULAR; INTRAVENOUS AS NEEDED
Status: DISCONTINUED | OUTPATIENT
Start: 2025-08-20 | End: 2025-08-20 | Stop reason: HOSPADM

## 2025-08-20 RX ORDER — DIPHENHYDRAMINE HYDROCHLORIDE 50 MG/ML
50 INJECTION, SOLUTION INTRAMUSCULAR; INTRAVENOUS AS NEEDED
OUTPATIENT
Start: 2025-09-17

## 2025-08-20 RX ORDER — EPINEPHRINE 0.3 MG/.3ML
0.3 INJECTION SUBCUTANEOUS EVERY 5 MIN PRN
Status: DISCONTINUED | OUTPATIENT
Start: 2025-08-20 | End: 2025-08-20 | Stop reason: HOSPADM

## 2025-08-20 RX ORDER — ALBUTEROL SULFATE 0.83 MG/ML
3 SOLUTION RESPIRATORY (INHALATION) AS NEEDED
OUTPATIENT
Start: 2025-09-17

## 2025-08-20 RX ORDER — EPINEPHRINE 0.3 MG/.3ML
0.3 INJECTION SUBCUTANEOUS EVERY 5 MIN PRN
OUTPATIENT
Start: 2025-09-17

## 2025-08-20 RX ORDER — FAMOTIDINE 10 MG/ML
20 INJECTION, SOLUTION INTRAVENOUS ONCE AS NEEDED
Status: DISCONTINUED | OUTPATIENT
Start: 2025-08-20 | End: 2025-08-20 | Stop reason: HOSPADM

## 2025-08-20 RX ORDER — ALBUTEROL SULFATE 0.83 MG/ML
3 SOLUTION RESPIRATORY (INHALATION) AS NEEDED
Status: DISCONTINUED | OUTPATIENT
Start: 2025-08-20 | End: 2025-08-20 | Stop reason: HOSPADM

## 2025-08-20 ASSESSMENT — ENCOUNTER SYMPTOMS
APPETITE CHANGE: 0
BRUISES/BLEEDS EASILY: 0
SHORTNESS OF BREATH: 0
NAUSEA: 0
DYSURIA: 0
CONSTIPATION: 0
CHILLS: 0
WHEEZING: 0
FREQUENCY: 0
COUGH: 0
MYALGIAS: 0
WOUND: 0
ABDOMINAL PAIN: 0
VOMITING: 0
FEVER: 0
ARTHRALGIAS: 0
SLEEP DISTURBANCE: 0
EYE PROBLEMS: 1
FATIGUE: 0
DIZZINESS: 0
DIARRHEA: 0
HEMATURIA: 0
LEG SWELLING: 0
EXTREMITY WEAKNESS: 0
HEADACHES: 0
LIGHT-HEADEDNESS: 0

## 2025-08-20 ASSESSMENT — PAIN SCALES - GENERAL: PAINLEVEL_OUTOF10: 0-NO PAIN

## 2025-09-17 ENCOUNTER — APPOINTMENT (OUTPATIENT)
Dept: INFUSION THERAPY | Facility: CLINIC | Age: 40
End: 2025-09-17
Payer: COMMERCIAL

## 2025-10-15 ENCOUNTER — APPOINTMENT (OUTPATIENT)
Dept: INFUSION THERAPY | Facility: CLINIC | Age: 40
End: 2025-10-15
Payer: COMMERCIAL

## 2025-11-12 ENCOUNTER — APPOINTMENT (OUTPATIENT)
Dept: INFUSION THERAPY | Facility: CLINIC | Age: 40
End: 2025-11-12
Payer: COMMERCIAL